# Patient Record
Sex: FEMALE | Race: WHITE | NOT HISPANIC OR LATINO | Employment: UNEMPLOYED | URBAN - NONMETROPOLITAN AREA
[De-identification: names, ages, dates, MRNs, and addresses within clinical notes are randomized per-mention and may not be internally consistent; named-entity substitution may affect disease eponyms.]

---

## 2017-01-28 PROCEDURE — 99284 EMERGENCY DEPT VISIT MOD MDM: CPT

## 2017-01-29 ENCOUNTER — APPOINTMENT (OUTPATIENT)
Dept: CT IMAGING | Facility: HOSPITAL | Age: 27
End: 2017-01-29

## 2017-01-29 ENCOUNTER — HOSPITAL ENCOUNTER (EMERGENCY)
Facility: HOSPITAL | Age: 27
Discharge: HOME | End: 2017-01-29
Attending: EMERGENCY MEDICINE | Admitting: EMERGENCY MEDICINE

## 2017-01-29 VITALS
TEMPERATURE: 97.9 F | WEIGHT: 155 LBS | DIASTOLIC BLOOD PRESSURE: 87 MMHG | SYSTOLIC BLOOD PRESSURE: 117 MMHG | HEART RATE: 79 BPM | HEIGHT: 60 IN | RESPIRATION RATE: 16 BRPM | OXYGEN SATURATION: 96 % | BODY MASS INDEX: 30.43 KG/M2

## 2017-01-29 DIAGNOSIS — K80.50 BILIARY COLIC: ICD-10-CM

## 2017-01-29 DIAGNOSIS — K80.20 CALCULUS OF GALLBLADDER WITHOUT CHOLECYSTITIS WITHOUT OBSTRUCTION: Primary | ICD-10-CM

## 2017-01-29 LAB
ALBUMIN SERPL-MCNC: 4 G/DL (ref 3.5–5)
ALBUMIN SERPL-MCNC: 4.2 G/DL (ref 3.5–5)
ALBUMIN/GLOB SERPL: 1.3 G/DL (ref 1–2)
ALBUMIN/GLOB SERPL: 1.4 G/DL (ref 1–2)
ALP SERPL-CCNC: 66 U/L (ref 38–126)
ALP SERPL-CCNC: 68 U/L (ref 38–126)
ALT SERPL W P-5'-P-CCNC: 93 U/L (ref 13–69)
ALT SERPL W P-5'-P-CCNC: 96 U/L (ref 13–69)
AMYLASE SERPL-CCNC: 48 U/L (ref 30–110)
ANION GAP SERPL CALCULATED.3IONS-SCNC: 12.2 MMOL/L
ANION GAP SERPL CALCULATED.3IONS-SCNC: 14.5 MMOL/L
AST SERPL-CCNC: 37 U/L (ref 15–46)
AST SERPL-CCNC: 37 U/L (ref 15–46)
B-HCG UR QL: NEGATIVE
BASOPHILS # BLD AUTO: 0.08 10*3/MM3 (ref 0–0.2)
BASOPHILS # BLD AUTO: 0.08 10*3/MM3 (ref 0–0.2)
BASOPHILS NFR BLD AUTO: 0.8 % (ref 0–2.5)
BASOPHILS NFR BLD AUTO: 0.8 % (ref 0–2.5)
BILIRUB SERPL-MCNC: 0.2 MG/DL (ref 0.2–1.3)
BILIRUB SERPL-MCNC: 0.3 MG/DL (ref 0.2–1.3)
BILIRUB UR QL STRIP: NEGATIVE
BUN BLD-MCNC: 12 MG/DL (ref 7–20)
BUN BLD-MCNC: 13 MG/DL (ref 7–20)
BUN/CREAT SERPL: 15 (ref 7.1–23.5)
BUN/CREAT SERPL: 16.3 (ref 7.1–23.5)
CALCIUM SPEC-SCNC: 9.3 MG/DL (ref 8.4–10.2)
CALCIUM SPEC-SCNC: 9.6 MG/DL (ref 8.4–10.2)
CHLORIDE SERPL-SCNC: 106 MMOL/L (ref 98–107)
CHLORIDE SERPL-SCNC: 106 MMOL/L (ref 98–107)
CLARITY UR: ABNORMAL
CO2 SERPL-SCNC: 26 MMOL/L (ref 26–30)
CO2 SERPL-SCNC: 28 MMOL/L (ref 26–30)
COLOR UR: ABNORMAL
CREAT BLD-MCNC: 0.8 MG/DL (ref 0.6–1.3)
CREAT BLD-MCNC: 0.8 MG/DL (ref 0.6–1.3)
DEPRECATED RDW RBC AUTO: 42.5 FL (ref 37–54)
DEPRECATED RDW RBC AUTO: 42.7 FL (ref 37–54)
EOSINOPHIL # BLD AUTO: 0.63 10*3/MM3 (ref 0–0.7)
EOSINOPHIL # BLD AUTO: 0.73 10*3/MM3 (ref 0–0.7)
EOSINOPHIL NFR BLD AUTO: 6.1 % (ref 0–7)
EOSINOPHIL NFR BLD AUTO: 7.1 % (ref 0–7)
ERYTHROCYTE [DISTWIDTH] IN BLOOD BY AUTOMATED COUNT: 12.4 % (ref 11.5–14.5)
ERYTHROCYTE [DISTWIDTH] IN BLOOD BY AUTOMATED COUNT: 12.5 % (ref 11.5–14.5)
GFR SERPL CREATININE-BSD FRML MDRD: 87 ML/MIN/1.73
GFR SERPL CREATININE-BSD FRML MDRD: 87 ML/MIN/1.73
GLOBULIN UR ELPH-MCNC: 3.1 GM/DL
GLOBULIN UR ELPH-MCNC: 3.1 GM/DL
GLUCOSE BLD-MCNC: 86 MG/DL (ref 74–98)
GLUCOSE BLD-MCNC: 87 MG/DL (ref 74–98)
GLUCOSE UR STRIP-MCNC: NEGATIVE MG/DL
HCG SERPL QL: NEGATIVE
HCT VFR BLD AUTO: 44.8 % (ref 37–47)
HCT VFR BLD AUTO: 45.6 % (ref 37–47)
HGB BLD-MCNC: 15.2 G/DL (ref 12–16)
HGB BLD-MCNC: 15.5 G/DL (ref 12–16)
HGB UR QL STRIP.AUTO: NEGATIVE
IMM GRANULOCYTES # BLD: 0.03 10*3/MM3 (ref 0–0.06)
IMM GRANULOCYTES # BLD: 0.04 10*3/MM3 (ref 0–0.06)
IMM GRANULOCYTES NFR BLD: 0.3 % (ref 0–0.6)
IMM GRANULOCYTES NFR BLD: 0.4 % (ref 0–0.6)
KETONES UR QL STRIP: NEGATIVE
LEUKOCYTE ESTERASE UR QL STRIP.AUTO: NEGATIVE
LIPASE SERPL-CCNC: 69 U/L (ref 23–300)
LIPASE SERPL-CCNC: 72 U/L (ref 23–300)
LYMPHOCYTES # BLD AUTO: 3.64 10*3/MM3 (ref 0.6–3.4)
LYMPHOCYTES # BLD AUTO: 3.64 10*3/MM3 (ref 0.6–3.4)
LYMPHOCYTES NFR BLD AUTO: 35 % (ref 10–50)
LYMPHOCYTES NFR BLD AUTO: 35.5 % (ref 10–50)
MCH RBC QN AUTO: 31.7 PG (ref 27–31)
MCH RBC QN AUTO: 31.8 PG (ref 27–31)
MCHC RBC AUTO-ENTMCNC: 33.9 G/DL (ref 30–37)
MCHC RBC AUTO-ENTMCNC: 34 G/DL (ref 30–37)
MCV RBC AUTO: 93.3 FL (ref 81–99)
MCV RBC AUTO: 93.6 FL (ref 81–99)
MONOCYTES # BLD AUTO: 0.63 10*3/MM3 (ref 0–0.9)
MONOCYTES # BLD AUTO: 0.7 10*3/MM3 (ref 0–0.9)
MONOCYTES NFR BLD AUTO: 6.1 % (ref 0–12)
MONOCYTES NFR BLD AUTO: 6.7 % (ref 0–12)
NEUTROPHILS # BLD AUTO: 5.13 10*3/MM3 (ref 2–6.9)
NEUTROPHILS # BLD AUTO: 5.33 10*3/MM3 (ref 2–6.9)
NEUTROPHILS NFR BLD AUTO: 50.1 % (ref 37–80)
NEUTROPHILS NFR BLD AUTO: 51.1 % (ref 37–80)
NITRITE UR QL STRIP: NEGATIVE
NRBC BLD MANUAL-RTO: 0 /100 WBC (ref 0–0)
NRBC BLD MANUAL-RTO: 0 /100 WBC (ref 0–0)
PH UR STRIP.AUTO: 5.5 [PH] (ref 5–8)
PLATELET # BLD AUTO: 267 10*3/MM3 (ref 130–400)
PLATELET # BLD AUTO: 273 10*3/MM3 (ref 130–400)
PMV BLD AUTO: 11.4 FL (ref 6–12)
PMV BLD AUTO: 12.1 FL (ref 6–12)
POTASSIUM BLD-SCNC: 4.2 MMOL/L (ref 3.5–5.1)
POTASSIUM BLD-SCNC: 4.5 MMOL/L (ref 3.5–5.1)
PROT SERPL-MCNC: 7.1 G/DL (ref 6.3–8.2)
PROT SERPL-MCNC: 7.3 G/DL (ref 6.3–8.2)
PROT UR QL STRIP: NEGATIVE
RBC # BLD AUTO: 4.8 10*6/MM3 (ref 4.2–5.4)
RBC # BLD AUTO: 4.87 10*6/MM3 (ref 4.2–5.4)
SODIUM BLD-SCNC: 142 MMOL/L (ref 137–145)
SODIUM BLD-SCNC: 142 MMOL/L (ref 137–145)
SP GR UR STRIP: 1.01 (ref 1–1.03)
UROBILINOGEN UR QL STRIP: ABNORMAL
WBC NRBC COR # BLD: 10.25 10*3/MM3 (ref 4.8–10.8)
WBC NRBC COR # BLD: 10.41 10*3/MM3 (ref 4.8–10.8)
WHOLE BLOOD HOLD SPECIMEN: NORMAL
WHOLE BLOOD HOLD SPECIMEN: NORMAL

## 2017-01-29 PROCEDURE — 74177 CT ABD & PELVIS W/CONTRAST: CPT

## 2017-01-29 PROCEDURE — 80053 COMPREHEN METABOLIC PANEL: CPT | Performed by: EMERGENCY MEDICINE

## 2017-01-29 PROCEDURE — 25010000002 ONDANSETRON PER 1 MG: Performed by: EMERGENCY MEDICINE

## 2017-01-29 PROCEDURE — 85025 COMPLETE CBC W/AUTO DIFF WBC: CPT | Performed by: EMERGENCY MEDICINE

## 2017-01-29 PROCEDURE — 84703 CHORIONIC GONADOTROPIN ASSAY: CPT | Performed by: EMERGENCY MEDICINE

## 2017-01-29 PROCEDURE — 96374 THER/PROPH/DIAG INJ IV PUSH: CPT

## 2017-01-29 PROCEDURE — 96361 HYDRATE IV INFUSION ADD-ON: CPT

## 2017-01-29 PROCEDURE — 82150 ASSAY OF AMYLASE: CPT | Performed by: EMERGENCY MEDICINE

## 2017-01-29 PROCEDURE — 0 IOPAMIDOL 61 % SOLUTION: Performed by: EMERGENCY MEDICINE

## 2017-01-29 PROCEDURE — 96376 TX/PRO/DX INJ SAME DRUG ADON: CPT

## 2017-01-29 PROCEDURE — 25010000002 HYDROMORPHONE PER 4 MG: Performed by: EMERGENCY MEDICINE

## 2017-01-29 PROCEDURE — 96375 TX/PRO/DX INJ NEW DRUG ADDON: CPT

## 2017-01-29 PROCEDURE — 83690 ASSAY OF LIPASE: CPT | Performed by: EMERGENCY MEDICINE

## 2017-01-29 PROCEDURE — 81003 URINALYSIS AUTO W/O SCOPE: CPT | Performed by: EMERGENCY MEDICINE

## 2017-01-29 PROCEDURE — 81025 URINE PREGNANCY TEST: CPT | Performed by: EMERGENCY MEDICINE

## 2017-01-29 RX ORDER — ONDANSETRON 2 MG/ML
4 INJECTION INTRAMUSCULAR; INTRAVENOUS ONCE
Status: COMPLETED | OUTPATIENT
Start: 2017-01-29 | End: 2017-01-29

## 2017-01-29 RX ORDER — FAMOTIDINE 10 MG/ML
20 INJECTION, SOLUTION INTRAVENOUS ONCE
Status: COMPLETED | OUTPATIENT
Start: 2017-01-29 | End: 2017-01-29

## 2017-01-29 RX ORDER — ONDANSETRON 4 MG/1
4 TABLET, ORALLY DISINTEGRATING ORAL EVERY 6 HOURS PRN
Qty: 10 TABLET | Refills: 0 | Status: ON HOLD | OUTPATIENT
Start: 2017-01-29 | End: 2017-06-05

## 2017-01-29 RX ORDER — HYDROCODONE BITARTRATE AND ACETAMINOPHEN 5; 325 MG/1; MG/1
1 TABLET ORAL EVERY 4 HOURS PRN
Qty: 14 TABLET | Refills: 0 | Status: SHIPPED | OUTPATIENT
Start: 2017-01-29 | End: 2017-02-08 | Stop reason: SDUPTHER

## 2017-01-29 RX ORDER — SODIUM CHLORIDE 0.9 % (FLUSH) 0.9 %
10 SYRINGE (ML) INJECTION AS NEEDED
Status: DISCONTINUED | OUTPATIENT
Start: 2017-01-29 | End: 2017-01-29 | Stop reason: HOSPADM

## 2017-01-29 RX ADMIN — HYDROMORPHONE HYDROCHLORIDE 1 MG: 1 INJECTION, SOLUTION INTRAMUSCULAR; INTRAVENOUS; SUBCUTANEOUS at 01:34

## 2017-01-29 RX ADMIN — FAMOTIDINE 20 MG: 10 INJECTION, SOLUTION INTRAVENOUS at 01:34

## 2017-01-29 RX ADMIN — IOPAMIDOL 100 ML: 612 INJECTION, SOLUTION INTRAVENOUS at 02:29

## 2017-01-29 RX ADMIN — SODIUM CHLORIDE 1000 ML: 9 INJECTION, SOLUTION INTRAVENOUS at 01:34

## 2017-01-29 RX ADMIN — HYDROMORPHONE HYDROCHLORIDE 1 MG: 1 INJECTION, SOLUTION INTRAMUSCULAR; INTRAVENOUS; SUBCUTANEOUS at 04:05

## 2017-01-29 RX ADMIN — ONDANSETRON 4 MG: 2 INJECTION INTRAMUSCULAR; INTRAVENOUS at 01:34

## 2017-02-08 ENCOUNTER — OFFICE VISIT (OUTPATIENT)
Dept: SURGERY | Facility: CLINIC | Age: 27
End: 2017-02-08

## 2017-02-08 VITALS
HEIGHT: 65 IN | SYSTOLIC BLOOD PRESSURE: 124 MMHG | DIASTOLIC BLOOD PRESSURE: 78 MMHG | BODY MASS INDEX: 25.99 KG/M2 | WEIGHT: 156 LBS | TEMPERATURE: 97.8 F | RESPIRATION RATE: 16 BRPM

## 2017-02-08 DIAGNOSIS — K80.12 CALCULUS OF GALLBLADDER WITH ACUTE ON CHRONIC CHOLECYSTITIS WITHOUT OBSTRUCTION: ICD-10-CM

## 2017-02-08 DIAGNOSIS — K81.9 CHOLECYSTITIS: Primary | ICD-10-CM

## 2017-02-08 PROCEDURE — 99243 OFF/OP CNSLTJ NEW/EST LOW 30: CPT | Performed by: SURGERY

## 2017-02-08 RX ORDER — CIPROFLOXACIN 750 MG/1
750 TABLET, FILM COATED ORAL 2 TIMES DAILY
Qty: 10 TABLET | Refills: 0
Start: 2017-02-08 | End: 2017-02-13

## 2017-02-08 RX ORDER — HYDROCODONE BITARTRATE AND ACETAMINOPHEN 5; 325 MG/1; MG/1
1 TABLET ORAL EVERY 4 HOURS PRN
Qty: 14 TABLET | Refills: 0 | Status: ON HOLD | OUTPATIENT
Start: 2017-02-08 | End: 2017-06-05

## 2017-02-08 RX ORDER — IBUPROFEN 600 MG/1
TABLET ORAL EVERY 6 HOURS
Status: ON HOLD | COMMUNITY
Start: 2015-12-17 | End: 2017-06-05

## 2017-02-08 NOTE — PROGRESS NOTES
Reason for Consultation: Cholecystitis      Chief complaint   Chief Complaint   Patient presents with   • Cholelithiasis     abn ct cholelithiasis/nausea,ruq pain,diarrhea worse with fatty foods       Subjective .     History of present illness:  I saw the patient in the office today as a consultation for evaluation and treatment of ***    Review of Systems   Gastrointestinal: Positive for abdominal pain (ruq pain), diarrhea and nausea.       History  Past Medical History   Diagnosis Date   • Asthma    • Cholelithiasis        Past Surgical History   Procedure Laterality Date   • Ear tubes     • Tonsillectomy         History reviewed. No pertinent family history.    Social History   Substance Use Topics   • Smoking status: Current Every Day Smoker     Packs/day: 1.00   • Smokeless tobacco: None   • Alcohol use No       Codeine    Objective     Vital Signs   Temp:  [97.8 °F (36.6 °C)] 97.8 °F (36.6 °C)  Resp:  [16] 16  BP: (124)/(78) 124/78    Physical Exam:     General Appearance:    Alert, cooperative, in no acute distress   Head:    Normocephalic, without obvious abnormality, atraumatic   Eyes:            Lids and lashes normal, conjunctivae and sclerae normal, no   icterus, no pallor, corneas clear, PERRLA   Ears:    Ears appear intact with no abnormalities noted   Throat:   No oral lesions, no thrush, oral mucosa moist   Neck:   No adenopathy, supple, trachea midline, no thyromegaly, no   carotid bruit, no JVD   Back:     No kyphosis present, no scoliosis present, no skin lesions,      erythema or scars, no tenderness to percussion or                   palpation,   range of motion normal   Lungs:     Clear to auscultation,respirations regular, even and                  unlabored    Heart:    Regular rhythm and normal rate, normal S1 and S2, no            murmur   Abdomen:     no masses, no organomegaly, soft non-tender, non-distended, no guarding, there is evidence of right upper quadrant tenderness  "  Extremities:   Moves all extremities well, no edema, no cyanosis, no             redness   Pulses:   Pulses palpable and equal bilaterally   Skin:   No bleeding, bruising or rash   Lymph nodes:   No palpable adenopathy   Neurologic:   Cranial nerves 2 - 12 grossly intact, sensation intact        Results Review:   {Results Review:76142::\"I reviewed the patient's new clinical results.\"}      Assessment/Plan :      I had a detailed and extensive discussion with the patient in the office and they understand that they need to undergo laparoscopic cholecystectomy with intraoperative cholangiography or possible open cholecystectomy. Full risks and benefits of operative versus nonoperative intervention were discussed with the patient and these included things such as nonresolution of symptoms and possible worsening of symptoms without surgical intervention versus infection, bleeding, open cholecystectomy, common bile duct injury, postoperative biliary leakage, need for drain placement, possible inability to perform cholangiography due to inflammation, postoperative abscess, etc with surgical intervention. The patient understands, agrees, and wishes to proceed with the surgical treatment plan as mentioned above. The patient had no questions for me at the end of the discussion.  I did draw a picture of the anatomy for the patient and used this in my informed consent.     I discussed the patients findings and my recommendations with patient and consulting provider.        Cee Quigley MA  02/08/17  2:55 PM          "

## 2017-02-08 NOTE — PROGRESS NOTES
Reason for Consultation: Cholecystitis      Chief complaint   Chief Complaint   Patient presents with   • Cholelithiasis     abn ct cholelithiasis/nausea,ruq pain,diarrhea worse with fatty foods       Subjective .     History of present illness:  I saw the patient in the office today as a consultation for evaluation and treatment of right upper quadrant pain present for the past 2 weeks, this is constant, sharp, in the right upper quadrant, radiates to the back, made worse with fatty meals, made better with not eating.  No dark colored urine or light colored stool.    Review of Systems   Constitutional: Positive for appetite change. Negative for fatigue and fever.   HENT: Negative for congestion, nosebleeds and postnasal drip.    Eyes: Negative for photophobia.   Respiratory: Negative for cough, choking, chest tightness and shortness of breath.    Cardiovascular: Negative for chest pain, palpitations and leg swelling.   Gastrointestinal: Positive for abdominal pain (right upper quadrant / epigastric pain) and nausea.   Endocrine: Negative for cold intolerance and heat intolerance.   Genitourinary: Negative for flank pain and frequency.   Musculoskeletal: Negative for arthralgias.   Neurological: Negative for seizures, light-headedness, numbness and headaches.   Psychiatric/Behavioral: Negative for agitation, behavioral problems, confusion and hallucinations.       History  Past Medical History   Diagnosis Date   • Asthma    • Cholelithiasis        Past Surgical History   Procedure Laterality Date   • Ear tubes     • Tonsillectomy         History reviewed. No pertinent family history.    Social History   Substance Use Topics   • Smoking status: Current Every Day Smoker     Packs/day: 1.00   • Smokeless tobacco: None   • Alcohol use No       Codeine    Objective     Vital Signs   Temp:  [97.8 °F (36.6 °C)] 97.8 °F (36.6 °C)  Resp:  [16] 16  BP: (124)/(78) 124/78    Physical Exam:     General Appearance:    Alert,  cooperative, in no acute distress   Head:    Normocephalic, without obvious abnormality, atraumatic   Eyes:            Lids and lashes normal, conjunctivae and sclerae normal, no   icterus, no pallor, corneas clear, PERRLA   Ears:    Ears appear intact with no abnormalities noted   Throat:   No oral lesions, no thrush, oral mucosa moist   Neck:   No adenopathy, supple, trachea midline, no thyromegaly, no   carotid bruit, no JVD   Back:     No kyphosis present, no scoliosis present, no skin lesions,      erythema or scars, no tenderness to percussion or                   palpation,   range of motion normal   Lungs:     Clear to auscultation,respirations regular, even and                  unlabored    Heart:    Regular rhythm and normal rate, normal S1 and S2, no            murmur   Abdomen:     no masses, no organomegaly, soft non-tender, non-distended, no guarding, there is evidence of right upper quadrant tenderness   Extremities:   Moves all extremities well, no edema, no cyanosis, no             redness   Pulses:   Pulses palpable and equal bilaterally   Skin:   No bleeding, bruising or rash   Lymph nodes:   No palpable adenopathy   Neurologic:   Cranial nerves 2 - 12 grossly intact, sensation intact        Results Review:   I reviewed the patient's new clinical results.  I reviewed the patient's new imaging results and agree with the interpretation.  I reviewed the patient's other test results and agree with the interpretation      Assessment/Plan :      I had a detailed and extensive discussion with the patient in the office and they understand that they need to undergo laparoscopic cholecystectomy with intraoperative cholangiography or possible open cholecystectomy sometime in the future.  First I want the patient to have a gallbladder ultrasound, I will start her on pain meds and antibiotics, and then see her again tomorrow.  They understand, agree, and had no questions for me at the end of the  discussion    Norris Almazan MD  02/08/17  3:40 PM

## 2017-02-09 ENCOUNTER — HOSPITAL ENCOUNTER (OUTPATIENT)
Dept: ULTRASOUND IMAGING | Facility: HOSPITAL | Age: 27
Discharge: HOME | End: 2017-02-09
Attending: SURGERY | Admitting: SURGERY

## 2017-02-09 ENCOUNTER — OFFICE VISIT (OUTPATIENT)
Dept: SURGERY | Facility: CLINIC | Age: 27
End: 2017-02-09

## 2017-02-09 VITALS
SYSTOLIC BLOOD PRESSURE: 128 MMHG | HEIGHT: 65 IN | WEIGHT: 156 LBS | BODY MASS INDEX: 25.99 KG/M2 | DIASTOLIC BLOOD PRESSURE: 80 MMHG | TEMPERATURE: 98.2 F | RESPIRATION RATE: 16 BRPM

## 2017-02-09 DIAGNOSIS — K81.9 CHOLECYSTITIS: ICD-10-CM

## 2017-02-09 DIAGNOSIS — K80.01 CALCULUS OF GALLBLADDER WITH ACUTE CHOLECYSTITIS AND OBSTRUCTION: ICD-10-CM

## 2017-02-09 DIAGNOSIS — K80.12 CALCULUS OF GALLBLADDER WITH ACUTE ON CHRONIC CHOLECYSTITIS WITHOUT OBSTRUCTION: ICD-10-CM

## 2017-02-09 DIAGNOSIS — K81.9 CHOLECYSTITIS: Primary | ICD-10-CM

## 2017-02-09 PROCEDURE — 76705 ECHO EXAM OF ABDOMEN: CPT

## 2017-02-09 PROCEDURE — 99213 OFFICE O/P EST LOW 20 MIN: CPT | Performed by: SURGERY

## 2017-02-09 NOTE — PROGRESS NOTES
Reason for Consultation: Cholecystitis      Chief complaint   Chief Complaint   Patient presents with   • Follow-up     f/u gb us, c/o nausea, ruq pain       Subjective .     History of present illness:  I saw the patient in the office today as a consultation for evaluation and treatment of right upper quadrant pain.  She did have a gallbladder ultrasound performed today, she states that she still has right upper quadrant pain with nausea.  She did have gallstones on the ultrasound, she stated that she was tender when the test was performed.    Review of Systems   Constitutional: Positive for appetite change. Negative for fatigue and fever.   HENT: Negative for congestion, nosebleeds and postnasal drip.    Eyes: Negative for photophobia.   Respiratory: Negative for cough, choking, chest tightness and shortness of breath.    Cardiovascular: Negative for chest pain, palpitations and leg swelling.   Gastrointestinal: Positive for abdominal pain (right upper quadrant / epigastric pain) and nausea.   Endocrine: Negative for cold intolerance and heat intolerance.   Genitourinary: Negative for flank pain and frequency.   Musculoskeletal: Negative for arthralgias.   Neurological: Negative for seizures, light-headedness, numbness and headaches.   Psychiatric/Behavioral: Negative for agitation, behavioral problems, confusion and hallucinations.       History  Past Medical History   Diagnosis Date   • Asthma    • Cholelithiasis        Past Surgical History   Procedure Laterality Date   • Ear tubes     • Tonsillectomy         Family History   Problem Relation Age of Onset   • No Known Problems Mother    • No Known Problems Father    • No Known Problems Sister    • No Known Problems Brother        Social History   Substance Use Topics   • Smoking status: Current Every Day Smoker     Packs/day: 1.00   • Smokeless tobacco: Never Used   • Alcohol use No       Codeine    Objective     Vital Signs   Temp:  [97.8 °F (36.6 °C)-98.2  °F (36.8 °C)] 98.2 °F (36.8 °C)  Resp:  [16] 16  BP: (124-128)/(78-80) 128/80    Physical Exam:     General Appearance:    Alert, cooperative, in no acute distress   Head:    Normocephalic, without obvious abnormality, atraumatic   Eyes:            Lids and lashes normal, conjunctivae and sclerae normal, no   icterus, no pallor, corneas clear, PERRLA   Ears:    Ears appear intact with no abnormalities noted   Throat:   No oral lesions, no thrush, oral mucosa moist   Neck:   No adenopathy, supple, trachea midline, no thyromegaly, no   carotid bruit, no JVD   Back:     No kyphosis present, no scoliosis present, no skin lesions,      erythema or scars, no tenderness to percussion or                   palpation,   range of motion normal   Lungs:     Clear to auscultation,respirations regular, even and                  unlabored    Heart:    Regular rhythm and normal rate, normal S1 and S2, no            murmur   Abdomen:     no masses, no organomegaly, soft non-tender, non-distended, no guarding, there is evidence of right upper quadrant tenderness   Extremities:   Moves all extremities well, no edema, no cyanosis, no             redness   Pulses:   Pulses palpable and equal bilaterally   Skin:   No bleeding, bruising or rash   Lymph nodes:   No palpable adenopathy   Neurologic:   Cranial nerves 2 - 12 grossly intact, sensation intact        Results Review:   I reviewed the patient's new clinical results.  I reviewed the patient's new imaging results and agree with the interpretation.  I reviewed the patient's other test results and agree with the interpretation      Assessment/Plan :      I had a detailed and extensive discussion with the patient in the office and they understand that they need to undergo laparoscopic cholecystectomy with intraoperative cholangiography or possible open cholecystectomy. Full risks and benefits of operative versus nonoperative intervention were discussed with the patient and these  included things such as nonresolution of symptoms and possible worsening of symptoms without surgical intervention versus infection, bleeding, open cholecystectomy, common bile duct injury, postoperative biliary leakage, need for drain placement, possible inability to perform cholangiography due to inflammation, postoperative abscess, etc with surgical intervention. The patient understands, agrees, and wishes to proceed with the surgical treatment plan as mentioned above. The patient had no questions for me at the end of the discussion.  I did draw a picture of the anatomy for the patient and used this in my informed consent.     I discussed the patients findings and my recommendations with patient and consulting provider.        Norris Almazan MD  02/09/17  9:48 AM

## 2017-02-09 NOTE — PROGRESS NOTES
"    Patient Care Team:  JOY Vasquez as PCP - General (Family Medicine)        Chief Complaint   Patient presents with   • Follow-up     f/u gb us, c/o nausea, ruq pain       Interval History:     History : ***    Review of Systems   Gastrointestinal: Positive for abdominal pain, diarrhea, nausea and vomiting.       Vital Signs  Visit Vitals   • /80 (BP Location: Left arm, Patient Position: Sitting)   • Temp 98.2 °F (36.8 °C) (Oral)   • Resp 16   • Ht 65\" (165.1 cm)   • Wt 156 lb (70.8 kg)   • BMI 25.96 kg/m2       Physical Exam:     General Appearance:    Alert, cooperative, in no acute distress   Head:    Normocephalic, without obvious abnormality, atraumatic   Lungs:     Clear to auscultation,respirations regular, even and                  unlabored    Heart:    Regular rhythm and normal rate, normal S1 and S2, no            murmur, no gallop, no rub, no click   Abdomen:     Normal bowel sounds, no masses, no organomegaly, soft        non-tender, non-distended, no guarding, no rebound                tenderness   Extremities:   Moves all extremities well, no edema, no cyanosis, no             redness   Pulses:   Pulses palpable and equal bilaterally   Skin:   No bleeding, bruising or rash        Assessment/Plan      There are no diagnoses linked to this encounter.              Norris Almazan MD  02/09/17  9:48 AM      "

## 2017-02-10 ENCOUNTER — OUTSIDE FACILITY SERVICE (OUTPATIENT)
Dept: SURGERY | Facility: CLINIC | Age: 27
End: 2017-02-10

## 2017-02-10 PROCEDURE — 47563 LAPARO CHOLECYSTECTOMY/GRAPH: CPT | Performed by: SURGERY

## 2017-02-13 ENCOUNTER — RESULTS ENCOUNTER (OUTPATIENT)
Dept: SURGERY | Facility: CLINIC | Age: 27
End: 2017-02-13

## 2017-02-13 DIAGNOSIS — K80.12 CALCULUS OF GALLBLADDER WITH ACUTE ON CHRONIC CHOLECYSTITIS WITHOUT OBSTRUCTION: ICD-10-CM

## 2017-02-13 DIAGNOSIS — K81.9 CHOLECYSTITIS: ICD-10-CM

## 2017-05-11 ENCOUNTER — HOSPITAL ENCOUNTER (EMERGENCY)
Facility: HOSPITAL | Age: 27
Discharge: HOME | End: 2017-05-11
Attending: EMERGENCY MEDICINE | Admitting: EMERGENCY MEDICINE

## 2017-05-11 ENCOUNTER — APPOINTMENT (OUTPATIENT)
Dept: GENERAL RADIOLOGY | Facility: HOSPITAL | Age: 27
End: 2017-05-11

## 2017-05-11 VITALS
DIASTOLIC BLOOD PRESSURE: 99 MMHG | TEMPERATURE: 98.7 F | OXYGEN SATURATION: 97 % | BODY MASS INDEX: 26.43 KG/M2 | HEART RATE: 117 BPM | SYSTOLIC BLOOD PRESSURE: 113 MMHG | WEIGHT: 140 LBS | RESPIRATION RATE: 16 BRPM | HEIGHT: 61 IN

## 2017-05-11 DIAGNOSIS — J06.9 URI WITH COUGH AND CONGESTION: Primary | ICD-10-CM

## 2017-05-11 PROCEDURE — 71020 HC CHEST PA AND LATERAL: CPT

## 2017-05-11 PROCEDURE — 96372 THER/PROPH/DIAG INJ SC/IM: CPT

## 2017-05-11 PROCEDURE — 99283 EMERGENCY DEPT VISIT LOW MDM: CPT

## 2017-05-11 PROCEDURE — 25010000002 KETOROLAC TROMETHAMINE PER 15 MG: Performed by: NURSE PRACTITIONER

## 2017-05-11 RX ORDER — ALBUTEROL SULFATE 90 UG/1
2 AEROSOL, METERED RESPIRATORY (INHALATION) EVERY 4 HOURS PRN
Qty: 18 G | Refills: 0 | Status: SHIPPED | OUTPATIENT
Start: 2017-05-11 | End: 2017-09-11 | Stop reason: SDUPTHER

## 2017-05-11 RX ORDER — INDOMETHACIN 50 MG/1
50 CAPSULE ORAL
Qty: 12 CAPSULE | Refills: 0 | Status: ON HOLD | OUTPATIENT
Start: 2017-05-11 | End: 2017-06-05

## 2017-05-11 RX ORDER — AMOXICILLIN AND CLAVULANATE POTASSIUM 875; 125 MG/1; MG/1
1 TABLET, FILM COATED ORAL 2 TIMES DAILY
Qty: 20 TABLET | Refills: 0 | Status: ON HOLD | OUTPATIENT
Start: 2017-05-11 | End: 2017-06-05

## 2017-05-11 RX ORDER — KETOROLAC TROMETHAMINE 30 MG/ML
60 INJECTION, SOLUTION INTRAMUSCULAR; INTRAVENOUS ONCE
Status: COMPLETED | OUTPATIENT
Start: 2017-05-11 | End: 2017-05-11

## 2017-05-11 RX ORDER — FLUTICASONE PROPIONATE 110 UG/1
2 AEROSOL, METERED RESPIRATORY (INHALATION)
Qty: 12 G | Refills: 0 | Status: ON HOLD | OUTPATIENT
Start: 2017-05-11 | End: 2017-06-05

## 2017-05-11 RX ORDER — ALBUTEROL SULFATE 90 UG/1
2 AEROSOL, METERED RESPIRATORY (INHALATION) EVERY 4 HOURS PRN
Status: ON HOLD | COMMUNITY
End: 2017-06-05

## 2017-05-11 RX ADMIN — KETOROLAC TROMETHAMINE 60 MG: 60 INJECTION, SOLUTION INTRAMUSCULAR at 18:07

## 2017-05-15 ENCOUNTER — HOSPITAL ENCOUNTER (EMERGENCY)
Facility: HOSPITAL | Age: 27
Discharge: HOME | End: 2017-05-15
Attending: EMERGENCY MEDICINE | Admitting: EMERGENCY MEDICINE

## 2017-05-15 ENCOUNTER — APPOINTMENT (OUTPATIENT)
Dept: GENERAL RADIOLOGY | Facility: HOSPITAL | Age: 27
End: 2017-05-15

## 2017-05-15 VITALS
OXYGEN SATURATION: 90 % | BODY MASS INDEX: 26.43 KG/M2 | RESPIRATION RATE: 20 BRPM | WEIGHT: 140 LBS | DIASTOLIC BLOOD PRESSURE: 76 MMHG | SYSTOLIC BLOOD PRESSURE: 118 MMHG | HEART RATE: 106 BPM | HEIGHT: 61 IN | TEMPERATURE: 99 F

## 2017-05-15 DIAGNOSIS — J18.9 PNEUMONIA OF LEFT LOWER LOBE DUE TO INFECTIOUS ORGANISM: Primary | ICD-10-CM

## 2017-05-15 PROCEDURE — 96372 THER/PROPH/DIAG INJ SC/IM: CPT

## 2017-05-15 PROCEDURE — 25010000002 KETOROLAC TROMETHAMINE PER 15 MG: Performed by: PHYSICIAN ASSISTANT

## 2017-05-15 PROCEDURE — 94640 AIRWAY INHALATION TREATMENT: CPT

## 2017-05-15 PROCEDURE — 99283 EMERGENCY DEPT VISIT LOW MDM: CPT

## 2017-05-15 PROCEDURE — 71101 X-RAY EXAM UNILAT RIBS/CHEST: CPT

## 2017-05-15 RX ORDER — DOXYCYCLINE 100 MG/1
100 CAPSULE ORAL EVERY 12 HOURS SCHEDULED
Status: DISCONTINUED | OUTPATIENT
Start: 2017-05-16 | End: 2017-05-15

## 2017-05-15 RX ORDER — BENZONATATE 100 MG/1
100 CAPSULE ORAL 3 TIMES DAILY PRN
Qty: 6 CAPSULE | Refills: 0 | Status: ON HOLD | OUTPATIENT
Start: 2017-05-15 | End: 2017-06-05

## 2017-05-15 RX ORDER — KETOROLAC TROMETHAMINE 30 MG/ML
60 INJECTION, SOLUTION INTRAMUSCULAR; INTRAVENOUS ONCE
Status: COMPLETED | OUTPATIENT
Start: 2017-05-15 | End: 2017-05-15

## 2017-05-15 RX ORDER — DOXYCYCLINE 100 MG/1
100 CAPSULE ORAL ONCE
Status: COMPLETED | OUTPATIENT
Start: 2017-05-15 | End: 2017-05-15

## 2017-05-15 RX ORDER — DOXYCYCLINE HYCLATE 50 MG/1
100 CAPSULE ORAL ONCE
Status: DISCONTINUED | OUTPATIENT
Start: 2017-05-15 | End: 2017-05-15

## 2017-05-15 RX ORDER — DOXYCYCLINE 100 MG/1
100 CAPSULE ORAL 2 TIMES DAILY
Qty: 14 CAPSULE | Refills: 0 | Status: SHIPPED | OUTPATIENT
Start: 2017-05-15 | End: 2017-05-22

## 2017-05-15 RX ORDER — IPRATROPIUM BROMIDE AND ALBUTEROL SULFATE 2.5; .5 MG/3ML; MG/3ML
3 SOLUTION RESPIRATORY (INHALATION) ONCE
Status: COMPLETED | OUTPATIENT
Start: 2017-05-15 | End: 2017-05-15

## 2017-05-15 RX ADMIN — DOXYCYCLINE 100 MG: 100 CAPSULE ORAL at 01:37

## 2017-05-15 RX ADMIN — KETOROLAC TROMETHAMINE 60 MG: 30 INJECTION, SOLUTION INTRAMUSCULAR at 01:17

## 2017-05-15 RX ADMIN — IPRATROPIUM BROMIDE AND ALBUTEROL SULFATE 3 ML: .5; 3 SOLUTION RESPIRATORY (INHALATION) at 01:08

## 2017-06-04 ENCOUNTER — HOSPITAL ENCOUNTER (EMERGENCY)
Facility: HOSPITAL | Age: 27
Discharge: OTHER INSTITUTION | End: 2017-06-05
Attending: EMERGENCY MEDICINE | Admitting: EMERGENCY MEDICINE

## 2017-06-04 ENCOUNTER — APPOINTMENT (OUTPATIENT)
Dept: CT IMAGING | Facility: HOSPITAL | Age: 27
End: 2017-06-04

## 2017-06-04 ENCOUNTER — APPOINTMENT (OUTPATIENT)
Dept: GENERAL RADIOLOGY | Facility: HOSPITAL | Age: 27
End: 2017-06-04

## 2017-06-04 DIAGNOSIS — J18.9 PNEUMONIA OF BOTH UPPER LOBES DUE TO INFECTIOUS ORGANISM: Primary | ICD-10-CM

## 2017-06-04 PROBLEM — J45.909 ASTHMA: Status: ACTIVE | Noted: 2017-06-04

## 2017-06-04 LAB
ALBUMIN SERPL-MCNC: 4.3 G/DL (ref 3.5–5)
ALBUMIN/GLOB SERPL: 1.3 G/DL (ref 1–2)
ALP SERPL-CCNC: 85 U/L (ref 38–126)
ALT SERPL W P-5'-P-CCNC: 88 U/L (ref 13–69)
ANION GAP SERPL CALCULATED.3IONS-SCNC: 15.5 MMOL/L
AST SERPL-CCNC: 50 U/L (ref 15–46)
BASOPHILS # BLD AUTO: 0.07 10*3/MM3 (ref 0–0.2)
BASOPHILS NFR BLD AUTO: 0.5 % (ref 0–2.5)
BILIRUB SERPL-MCNC: 1.1 MG/DL (ref 0.2–1.3)
BILIRUB UR QL STRIP: NEGATIVE
BUN BLD-MCNC: 9 MG/DL (ref 7–20)
BUN/CREAT SERPL: 12.9 (ref 7.1–23.5)
CALCIUM SPEC-SCNC: 9 MG/DL (ref 8.4–10.2)
CHLORIDE SERPL-SCNC: 99 MMOL/L (ref 98–107)
CLARITY UR: CLEAR
CO2 SERPL-SCNC: 24 MMOL/L (ref 26–30)
COLOR UR: YELLOW
CREAT BLD-MCNC: 0.7 MG/DL (ref 0.6–1.3)
D-LACTATE SERPL-SCNC: 0.9 MMOL/L (ref 0.5–2)
DEPRECATED RDW RBC AUTO: 41 FL (ref 37–54)
EOSINOPHIL # BLD AUTO: 0.73 10*3/MM3 (ref 0–0.7)
EOSINOPHIL NFR BLD AUTO: 4.7 % (ref 0–7)
ERYTHROCYTE [DISTWIDTH] IN BLOOD BY AUTOMATED COUNT: 12.1 % (ref 11.5–14.5)
GFR SERPL CREATININE-BSD FRML MDRD: 101 ML/MIN/1.73
GLOBULIN UR ELPH-MCNC: 3.2 GM/DL
GLUCOSE BLD-MCNC: 107 MG/DL (ref 74–98)
GLUCOSE UR STRIP-MCNC: NEGATIVE MG/DL
HCT VFR BLD AUTO: 44.9 % (ref 37–47)
HGB BLD-MCNC: 15.3 G/DL (ref 12–16)
HGB UR QL STRIP.AUTO: NEGATIVE
HOLD SPECIMEN: NORMAL
HOLD SPECIMEN: NORMAL
IMM GRANULOCYTES # BLD: 0.05 10*3/MM3 (ref 0–0.06)
IMM GRANULOCYTES NFR BLD: 0.3 % (ref 0–0.6)
KETONES UR QL STRIP: NEGATIVE
LEUKOCYTE ESTERASE UR QL STRIP.AUTO: NEGATIVE
LYMPHOCYTES # BLD AUTO: 2.43 10*3/MM3 (ref 0.6–3.4)
LYMPHOCYTES NFR BLD AUTO: 15.6 % (ref 10–50)
MCH RBC QN AUTO: 31.4 PG (ref 27–31)
MCHC RBC AUTO-ENTMCNC: 34.1 G/DL (ref 30–37)
MCV RBC AUTO: 92.2 FL (ref 81–99)
MONOCYTES # BLD AUTO: 1.22 10*3/MM3 (ref 0–0.9)
MONOCYTES NFR BLD AUTO: 7.8 % (ref 0–12)
NEUTROPHILS # BLD AUTO: 11.05 10*3/MM3 (ref 2–6.9)
NEUTROPHILS NFR BLD AUTO: 71.1 % (ref 37–80)
NITRITE UR QL STRIP: NEGATIVE
NRBC BLD MANUAL-RTO: 0 /100 WBC (ref 0–0)
PH UR STRIP.AUTO: <=5 [PH] (ref 5–8)
PLATELET # BLD AUTO: 225 10*3/MM3 (ref 130–400)
PMV BLD AUTO: 11.7 FL (ref 6–12)
POTASSIUM BLD-SCNC: 4.5 MMOL/L (ref 3.5–5.1)
PROT SERPL-MCNC: 7.5 G/DL (ref 6.3–8.2)
PROT UR QL STRIP: NEGATIVE
RBC # BLD AUTO: 4.87 10*6/MM3 (ref 4.2–5.4)
SODIUM BLD-SCNC: 134 MMOL/L (ref 137–145)
SP GR UR STRIP: <=1.005 (ref 1–1.03)
UROBILINOGEN UR QL STRIP: NORMAL
WBC NRBC COR # BLD: 15.55 10*3/MM3 (ref 4.8–10.8)
WHOLE BLOOD HOLD SPECIMEN: NORMAL
WHOLE BLOOD HOLD SPECIMEN: NORMAL

## 2017-06-04 PROCEDURE — 83605 ASSAY OF LACTIC ACID: CPT

## 2017-06-04 PROCEDURE — 96375 TX/PRO/DX INJ NEW DRUG ADDON: CPT

## 2017-06-04 PROCEDURE — 85025 COMPLETE CBC W/AUTO DIFF WBC: CPT

## 2017-06-04 PROCEDURE — 87040 BLOOD CULTURE FOR BACTERIA: CPT

## 2017-06-04 PROCEDURE — 94799 UNLISTED PULMONARY SVC/PX: CPT

## 2017-06-04 PROCEDURE — 25010000002 CEFTRIAXONE: Performed by: NURSE PRACTITIONER

## 2017-06-04 PROCEDURE — 94640 AIRWAY INHALATION TREATMENT: CPT

## 2017-06-04 PROCEDURE — 96365 THER/PROPH/DIAG IV INF INIT: CPT

## 2017-06-04 PROCEDURE — 0 IOPAMIDOL 61 % SOLUTION: Performed by: EMERGENCY MEDICINE

## 2017-06-04 PROCEDURE — 80053 COMPREHEN METABOLIC PANEL: CPT

## 2017-06-04 PROCEDURE — 71275 CT ANGIOGRAPHY CHEST: CPT

## 2017-06-04 PROCEDURE — 25010000002 METHYLPREDNISOLONE PER 125 MG: Performed by: EMERGENCY MEDICINE

## 2017-06-04 PROCEDURE — 71020 HC CHEST PA AND LATERAL: CPT

## 2017-06-04 PROCEDURE — 93005 ELECTROCARDIOGRAM TRACING: CPT

## 2017-06-04 PROCEDURE — 81003 URINALYSIS AUTO W/O SCOPE: CPT | Performed by: EMERGENCY MEDICINE

## 2017-06-04 PROCEDURE — 99284 EMERGENCY DEPT VISIT MOD MDM: CPT

## 2017-06-04 PROCEDURE — 96361 HYDRATE IV INFUSION ADD-ON: CPT

## 2017-06-04 RX ORDER — IBUPROFEN 600 MG/1
600 TABLET ORAL ONCE
Status: COMPLETED | OUTPATIENT
Start: 2017-06-04 | End: 2017-06-04

## 2017-06-04 RX ORDER — IPRATROPIUM BROMIDE AND ALBUTEROL SULFATE 2.5; .5 MG/3ML; MG/3ML
3 SOLUTION RESPIRATORY (INHALATION) ONCE
Status: COMPLETED | OUTPATIENT
Start: 2017-06-04 | End: 2017-06-04

## 2017-06-04 RX ORDER — METHYLPREDNISOLONE SODIUM SUCCINATE 125 MG/2ML
125 INJECTION, POWDER, LYOPHILIZED, FOR SOLUTION INTRAMUSCULAR; INTRAVENOUS ONCE
Status: COMPLETED | OUTPATIENT
Start: 2017-06-04 | End: 2017-06-04

## 2017-06-04 RX ORDER — SODIUM CHLORIDE 0.9 % (FLUSH) 0.9 %
10 SYRINGE (ML) INJECTION AS NEEDED
Status: DISCONTINUED | OUTPATIENT
Start: 2017-06-04 | End: 2017-06-05 | Stop reason: HOSPADM

## 2017-06-04 RX ORDER — ACETAMINOPHEN 325 MG/1
975 TABLET ORAL ONCE
Status: COMPLETED | OUTPATIENT
Start: 2017-06-04 | End: 2017-06-04

## 2017-06-04 RX ADMIN — SODIUM CHLORIDE 1000 ML: 9 INJECTION, SOLUTION INTRAVENOUS at 18:10

## 2017-06-04 RX ADMIN — HYDROCODONE POLISTIREX AND CHLORPHENIRAMINE POLISTIREX 5 ML: 10; 8 SUSPENSION, EXTENDED RELEASE ORAL at 19:23

## 2017-06-04 RX ADMIN — CEFTRIAXONE 1 G: 1 INJECTION, POWDER, FOR SOLUTION INTRAMUSCULAR; INTRAVENOUS at 19:43

## 2017-06-04 RX ADMIN — ACETAMINOPHEN 975 MG: 325 TABLET, FILM COATED ORAL at 19:22

## 2017-06-04 RX ADMIN — IOPAMIDOL 100 ML: 612 INJECTION, SOLUTION INTRAVENOUS at 21:29

## 2017-06-04 RX ADMIN — SODIUM CHLORIDE 2000 ML: 9 INJECTION, SOLUTION INTRAVENOUS at 19:42

## 2017-06-04 RX ADMIN — IPRATROPIUM BROMIDE AND ALBUTEROL SULFATE 3 ML: .5; 3 SOLUTION RESPIRATORY (INHALATION) at 17:56

## 2017-06-04 RX ADMIN — METHYLPREDNISOLONE SODIUM SUCCINATE 125 MG: 125 INJECTION, POWDER, FOR SOLUTION INTRAMUSCULAR; INTRAVENOUS at 19:23

## 2017-06-04 RX ADMIN — IBUPROFEN 600 MG: 600 TABLET ORAL at 19:22

## 2017-06-04 RX ADMIN — IPRATROPIUM BROMIDE AND ALBUTEROL SULFATE 3 ML: .5; 3 SOLUTION RESPIRATORY (INHALATION) at 19:06

## 2017-06-04 NOTE — ED PROVIDER NOTES
Subjective   HPI Comments: Patient presents to the emergency department with shortness of breath and increasing cough.  Further history reveals that the patient has been diagnosed with left lower lobe pneumonia in the recent 2 weeks.  She completed a course of doxycycline without improvement.  She did not know she had a fever on arrival.      History provided by:  Patient   used: No        Review of Systems   Constitutional: Positive for appetite change, chills, fatigue and fever. Negative for diaphoresis.   HENT: Negative for sore throat.    Eyes: Negative.  Negative for photophobia, pain and visual disturbance.   Respiratory: Positive for cough, chest tightness and wheezing. Negative for shortness of breath and stridor.    Cardiovascular: Negative.  Negative for chest pain.   Gastrointestinal: Negative.  Negative for abdominal pain, diarrhea, nausea and vomiting.   Endocrine: Negative.    Genitourinary: Negative.  Negative for dysuria.   Musculoskeletal: Negative.  Negative for back pain and neck pain.   Skin: Negative.  Negative for pallor and rash.   Allergic/Immunologic: Negative.    Neurological: Negative.  Negative for syncope and headaches.   Hematological: Negative.    Psychiatric/Behavioral: Negative.  Negative for agitation.   All other systems reviewed and are negative.      Past Medical History:   Diagnosis Date   • Asthma    • Cholelithiasis        Allergies   Allergen Reactions   • Codeine Nausea And Vomiting       Past Surgical History:   Procedure Laterality Date   • ADENOIDECTOMY     • EAR TUBES     • TONSILLECTOMY         Family History   Problem Relation Age of Onset   • No Known Problems Mother    • No Known Problems Father    • No Known Problems Sister    • No Known Problems Brother        Social History     Social History   • Marital status: Single     Spouse name: N/A   • Number of children: N/A   • Years of education: N/A     Social History Main Topics   • Smoking status:  Current Every Day Smoker     Packs/day: 0.50     Types: Cigarettes   • Smokeless tobacco: Never Used   • Alcohol use No   • Drug use: No   • Sexual activity: Defer     Other Topics Concern   • None     Social History Narrative   • None           Objective   Physical Exam   Constitutional: She is oriented to person, place, and time. She appears well-developed and well-nourished.   Vital signs are acknowledged.  Patient is febrile, tachycardic, hypotensive, and hypoxic on initial arrival   HENT:   Head: Normocephalic and atraumatic.   Right Ear: External ear normal.   Left Ear: External ear normal.   Mouth/Throat: Oropharynx is clear and moist.   Eyes: Conjunctivae and EOM are normal. Pupils are equal, round, and reactive to light.   Neck: Normal range of motion. Neck supple. No tracheal deviation present. No thyromegaly present.   Cardiovascular: Regular rhythm and normal heart sounds.  Exam reveals no gallop and no friction rub.    No murmur heard.  Pulmonary/Chest: No stridor. She is in respiratory distress. She has wheezes. She has rales. She exhibits tenderness.   Abdominal: Soft.   Musculoskeletal: Normal range of motion. She exhibits no edema, tenderness or deformity.   Neurological: She is alert and oriented to person, place, and time.   No meningeal signs   Skin: Skin is warm and dry. No rash noted. No erythema. No pallor.   Psychiatric: She has a normal mood and affect.   Vitals reviewed.      Procedures         ED Course  ED Course   Comment By Time   Patient is no longer febrile.  She has had 2 breathing treatments.  She has had a liter of fluid.  Her heart rate remains 110s to 120s.  Oxygen saturation on room air is 91-92%.  Review chest x-ray with Dr. Abraham and no fred infiltrate is apparent.  In light of the patient's vital signs, I am going to order a CT of the chest. JOY Nieto 06/04 2009   Discussed case with our hospitalist who recommends ICU admission.  We are out of ICU beds  currently.  Consultation with intensivist [facility] is pending Diana Bhatia, JOY 06/04 1904   Discussed case with Dr. Garrison, intensivist who agrees with plan of care for admission in ICU and accepts this patient.  Patient understands and concurs. Diana Bhatia, JOY 06/04 4670                  [facility]    Final diagnoses:   Pneumonia of both upper lobes due to infectious organism            JOY Nieto  06/04/17 3571       JOY Nieto  06/05/17 0030

## 2017-06-05 ENCOUNTER — APPOINTMENT (OUTPATIENT)
Dept: CARDIOLOGY | Facility: HOSPITAL | Age: 27
End: 2017-06-05

## 2017-06-05 ENCOUNTER — HOSPITAL ENCOUNTER (INPATIENT)
Facility: HOSPITAL | Age: 27
LOS: 2 days | Discharge: HOME | End: 2017-06-07
Attending: INTERNAL MEDICINE | Admitting: INTERNAL MEDICINE

## 2017-06-05 ENCOUNTER — APPOINTMENT (OUTPATIENT)
Dept: GENERAL RADIOLOGY | Facility: HOSPITAL | Age: 27
End: 2017-06-05

## 2017-06-05 VITALS
WEIGHT: 132 LBS | OXYGEN SATURATION: 94 % | DIASTOLIC BLOOD PRESSURE: 56 MMHG | RESPIRATION RATE: 16 BRPM | HEART RATE: 109 BPM | HEIGHT: 61 IN | SYSTOLIC BLOOD PRESSURE: 96 MMHG | TEMPERATURE: 98.3 F | BODY MASS INDEX: 24.92 KG/M2

## 2017-06-05 PROBLEM — F11.10 OPIOID ABUSE (HCC): Status: ACTIVE | Noted: 2017-06-05

## 2017-06-05 PROBLEM — J18.9 PNEUMONIA: Status: ACTIVE | Noted: 2017-06-05

## 2017-06-05 PROBLEM — Z72.0 TOBACCO ABUSE: Status: ACTIVE | Noted: 2017-06-05

## 2017-06-05 PROBLEM — A41.9 SEPSIS: Status: ACTIVE | Noted: 2017-06-05

## 2017-06-05 LAB
AMPHET+METHAMPHET UR QL: NEGATIVE
AMPHETAMINES UR QL: NEGATIVE
ARTERIAL PATENCY WRIST A: ABNORMAL
ATMOSPHERIC PRESS: ABNORMAL MMHG
BARBITURATES UR QL SCN: NEGATIVE
BASE EXCESS BLDA CALC-SCNC: -1.9 MMOL/L (ref 0–2)
BDY SITE: ABNORMAL
BENZODIAZ UR QL SCN: NEGATIVE
BH CV ECHO MEAS - AO MAX PG (FULL): 3.4 MMHG
BH CV ECHO MEAS - AO MAX PG: 10.6 MMHG
BH CV ECHO MEAS - AO ROOT AREA (BSA CORRECTED): 1.6
BH CV ECHO MEAS - AO ROOT AREA: 5.7 CM^2
BH CV ECHO MEAS - AO ROOT DIAM: 2.7 CM
BH CV ECHO MEAS - AO V2 MAX: 163 CM/SEC
BH CV ECHO MEAS - AVA(V,A): 2.6 CM^2
BH CV ECHO MEAS - AVA(V,D): 2.6 CM^2
BH CV ECHO MEAS - BSA(HAYCOCK): 1.7 M^2
BH CV ECHO MEAS - BSA: 1.6 M^2
BH CV ECHO MEAS - BZI_BMI: 27.4 KILOGRAMS/M^2
BH CV ECHO MEAS - BZI_METRIC_HEIGHT: 154.9 CM
BH CV ECHO MEAS - BZI_METRIC_WEIGHT: 65.8 KG
BH CV ECHO MEAS - CONTRAST EF (2CH): 63 ML/M^2
BH CV ECHO MEAS - CONTRAST EF 4CH: 59.4 ML/M^2
BH CV ECHO MEAS - EDV(CUBED): 87.5 ML
BH CV ECHO MEAS - EDV(MOD-SP2): 54 ML
BH CV ECHO MEAS - EDV(MOD-SP4): 64 ML
BH CV ECHO MEAS - EDV(TEICH): 89.6 ML
BH CV ECHO MEAS - EF(CUBED): 79.5 %
BH CV ECHO MEAS - EF(MOD-SP2): 63 %
BH CV ECHO MEAS - EF(MOD-SP4): 59.4 %
BH CV ECHO MEAS - EF(TEICH): 72 %
BH CV ECHO MEAS - ESV(CUBED): 18 ML
BH CV ECHO MEAS - ESV(MOD-SP2): 20 ML
BH CV ECHO MEAS - ESV(MOD-SP4): 26 ML
BH CV ECHO MEAS - ESV(TEICH): 25.1 ML
BH CV ECHO MEAS - FS: 41 %
BH CV ECHO MEAS - IVS/LVPW: 0.89
BH CV ECHO MEAS - IVSD: 0.76 CM
BH CV ECHO MEAS - LA DIMENSION: 2.7 CM
BH CV ECHO MEAS - LA/AO: 1
BH CV ECHO MEAS - LAT PEAK E' VEL: 23.5 CM/SEC
BH CV ECHO MEAS - LV DIASTOLIC VOL/BSA (35-75): 38.8 ML/M^2
BH CV ECHO MEAS - LV MASS(C)D: 112.6 GRAMS
BH CV ECHO MEAS - LV MASS(C)DI: 68.3 GRAMS/M^2
BH CV ECHO MEAS - LV MAX PG: 7.2 MMHG
BH CV ECHO MEAS - LV MEAN PG: 4 MMHG
BH CV ECHO MEAS - LV SYSTOLIC VOL/BSA (12-30): 15.8 ML/M^2
BH CV ECHO MEAS - LV V1 MAX: 134 CM/SEC
BH CV ECHO MEAS - LV V1 MEAN: 87.4 CM/SEC
BH CV ECHO MEAS - LV V1 VTI: 23.3 CM
BH CV ECHO MEAS - LVIDD: 4.4 CM
BH CV ECHO MEAS - LVIDS: 2.6 CM
BH CV ECHO MEAS - LVLD AP2: 7.8 CM
BH CV ECHO MEAS - LVLD AP4: 7.4 CM
BH CV ECHO MEAS - LVLS AP2: 5.6 CM
BH CV ECHO MEAS - LVLS AP4: 6.5 CM
BH CV ECHO MEAS - LVOT AREA (M): 3.1 CM^2
BH CV ECHO MEAS - LVOT AREA: 3.1 CM^2
BH CV ECHO MEAS - LVOT DIAM: 2 CM
BH CV ECHO MEAS - LVPWD: 0.86 CM
BH CV ECHO MEAS - MED PEAK E' VEL: 14.1 CM/SEC
BH CV ECHO MEAS - MV A MAX VEL: 85.8 CM/SEC
BH CV ECHO MEAS - MV E MAX VEL: 82.1 CM/SEC
BH CV ECHO MEAS - MV E/A: 0.96
BH CV ECHO MEAS - PA ACC SLOPE: 911 CM/SEC^2
BH CV ECHO MEAS - PA ACC TIME: 0.09 SEC
BH CV ECHO MEAS - PA MAX PG: 5.6 MMHG
BH CV ECHO MEAS - PA PR(ACCEL): 40.8 MMHG
BH CV ECHO MEAS - PA V2 MAX: 118 CM/SEC
BH CV ECHO MEAS - RVDD: 2.2 CM
BH CV ECHO MEAS - SI(CUBED): 42.2 ML/M^2
BH CV ECHO MEAS - SI(LVOT): 44.4 ML/M^2
BH CV ECHO MEAS - SI(MOD-SP2): 20.6 ML/M^2
BH CV ECHO MEAS - SI(MOD-SP4): 23.1 ML/M^2
BH CV ECHO MEAS - SI(TEICH): 39.1 ML/M^2
BH CV ECHO MEAS - SV(CUBED): 69.5 ML
BH CV ECHO MEAS - SV(LVOT): 73.2 ML
BH CV ECHO MEAS - SV(MOD-SP2): 34 ML
BH CV ECHO MEAS - SV(MOD-SP4): 38 ML
BH CV ECHO MEAS - SV(TEICH): 64.5 ML
BH CV ECHO MEAS - TAPSE (>1.6): 2.1 CM2
BH CV VAS BP RIGHT ARM: NORMAL MMHG
BH CV XLRA - RV BASE: 2.9 CM
BH CV XLRA - RV LENGTH: 7.9 CM
BH CV XLRA - RV MID: 2.4 CM
BH CV XLRA - TDI S': 19.2 CM/SEC
BUPRENORPHINE SERPL-MCNC: POSITIVE NG/ML
CA-I SERPL ISE-MCNC: 1.19 MMOL/L (ref 1.12–1.32)
CANNABINOIDS SERPL QL: NEGATIVE
CO2 BLDA-SCNC: 24.4 MMOL/L (ref 22–33)
COCAINE UR QL: NEGATIVE
COHGB MFR BLD: 2.3 % (ref 0–2)
E/E' RATIO: 4
HAV IGM SERPL QL IA: ABNORMAL
HBV CORE IGM SERPL QL IA: ABNORMAL
HBV SURFACE AG SERPL QL IA: ABNORMAL
HCO3 BLDA-SCNC: 23.2 MMOL/L (ref 20–26)
HCT VFR BLD CALC: 44.4 %
HCV AB SER DONR QL: REACTIVE
HGB BLDA-MCNC: 14.5 G/DL (ref 14–18)
HIV1+2 AB SER QL: NORMAL
HOROWITZ INDEX BLD+IHG-RTO: 28 %
LEFT ATRIUM VOLUME INDEX: 21 ML/M2
LEFT ATRIUM VOLUME: 35 CM3
LV EF 2D ECHO EST: 60 %
MAGNESIUM SERPL-MCNC: 2.1 MG/DL (ref 1.3–2.7)
METHADONE UR QL SCN: NEGATIVE
METHGB BLD QL: 0.6 % (ref 0–1.5)
MODALITY: ABNORMAL
OPIATES UR QL: POSITIVE
OXYCODONE UR QL SCN: POSITIVE
OXYHGB MFR BLDV: 91.5 % (ref 94–99)
PCO2 BLDA: 39.6 MM HG (ref 35–45)
PCP UR QL SCN: NEGATIVE
PH BLDA: 7.38 PH UNITS (ref 7.35–7.45)
PHOSPHATE SERPL-MCNC: 1.8 MG/DL (ref 2.4–5.1)
PO2 BLDA: 66.1 MM HG (ref 83–108)
PROCALCITONIN SERPL-MCNC: 0.07 NG/ML
PROPOXYPH UR QL: NEGATIVE
TRICYCLICS UR QL SCN: NEGATIVE

## 2017-06-05 PROCEDURE — G0432 EIA HIV-1/HIV-2 SCREEN: HCPCS | Performed by: INTERNAL MEDICINE

## 2017-06-05 PROCEDURE — 80074 ACUTE HEPATITIS PANEL: CPT | Performed by: INTERNAL MEDICINE

## 2017-06-05 PROCEDURE — 25010000002 AZITHROMYCIN: Performed by: NURSE PRACTITIONER

## 2017-06-05 PROCEDURE — 36600 WITHDRAWAL OF ARTERIAL BLOOD: CPT | Performed by: NURSE PRACTITIONER

## 2017-06-05 PROCEDURE — 87522 HEPATITIS C REVRS TRNSCRPJ: CPT | Performed by: INTERNAL MEDICINE

## 2017-06-05 PROCEDURE — 25010000002 METHYLPREDNISOLONE PER 125 MG: Performed by: INTERNAL MEDICINE

## 2017-06-05 PROCEDURE — 99291 CRITICAL CARE FIRST HOUR: CPT | Performed by: INTERNAL MEDICINE

## 2017-06-05 PROCEDURE — 99233 SBSQ HOSP IP/OBS HIGH 50: CPT | Performed by: INTERNAL MEDICINE

## 2017-06-05 PROCEDURE — 93306 TTE W/DOPPLER COMPLETE: CPT | Performed by: INTERNAL MEDICINE

## 2017-06-05 PROCEDURE — 84145 PROCALCITONIN (PCT): CPT | Performed by: NURSE PRACTITIONER

## 2017-06-05 PROCEDURE — 83735 ASSAY OF MAGNESIUM: CPT | Performed by: NURSE PRACTITIONER

## 2017-06-05 PROCEDURE — 25010000002 KETOROLAC TROMETHAMINE PER 15 MG: Performed by: NURSE PRACTITIONER

## 2017-06-05 PROCEDURE — 82330 ASSAY OF CALCIUM: CPT | Performed by: NURSE PRACTITIONER

## 2017-06-05 PROCEDURE — 93306 TTE W/DOPPLER COMPLETE: CPT

## 2017-06-05 PROCEDURE — 71010 HC CHEST PA OR AP: CPT

## 2017-06-05 PROCEDURE — 94799 UNLISTED PULMONARY SVC/PX: CPT

## 2017-06-05 PROCEDURE — 94640 AIRWAY INHALATION TREATMENT: CPT

## 2017-06-05 PROCEDURE — 94760 N-INVAS EAR/PLS OXIMETRY 1: CPT

## 2017-06-05 PROCEDURE — 82805 BLOOD GASES W/O2 SATURATION: CPT | Performed by: NURSE PRACTITIONER

## 2017-06-05 PROCEDURE — 80306 DRUG TEST PRSMV INSTRMNT: CPT | Performed by: NURSE PRACTITIONER

## 2017-06-05 PROCEDURE — 84100 ASSAY OF PHOSPHORUS: CPT | Performed by: NURSE PRACTITIONER

## 2017-06-05 PROCEDURE — 25010000002 ENOXAPARIN PER 10 MG: Performed by: NURSE PRACTITIONER

## 2017-06-05 PROCEDURE — 25010000003 CEFTRIAXONE PER 250 MG: Performed by: NURSE PRACTITIONER

## 2017-06-05 RX ORDER — IPRATROPIUM BROMIDE AND ALBUTEROL SULFATE 2.5; .5 MG/3ML; MG/3ML
3 SOLUTION RESPIRATORY (INHALATION) EVERY 4 HOURS PRN
Status: DISCONTINUED | OUTPATIENT
Start: 2017-06-05 | End: 2017-06-05

## 2017-06-05 RX ORDER — ACETAMINOPHEN 650 MG/1
650 SUPPOSITORY RECTAL EVERY 4 HOURS PRN
Status: DISCONTINUED | OUTPATIENT
Start: 2017-06-05 | End: 2017-06-05

## 2017-06-05 RX ORDER — IPRATROPIUM BROMIDE AND ALBUTEROL SULFATE 2.5; .5 MG/3ML; MG/3ML
3 SOLUTION RESPIRATORY (INHALATION)
Status: DISCONTINUED | OUTPATIENT
Start: 2017-06-05 | End: 2017-06-07 | Stop reason: HOSPADM

## 2017-06-05 RX ORDER — FAMOTIDINE 20 MG/1
20 TABLET, FILM COATED ORAL DAILY
Status: DISCONTINUED | OUTPATIENT
Start: 2017-06-05 | End: 2017-06-05

## 2017-06-05 RX ORDER — NICOTINE 21 MG/24HR
1 PATCH, TRANSDERMAL 24 HOURS TRANSDERMAL EVERY 24 HOURS
Status: DISCONTINUED | OUTPATIENT
Start: 2017-06-05 | End: 2017-06-06 | Stop reason: SDUPTHER

## 2017-06-05 RX ORDER — HYDROCODONE BITARTRATE AND ACETAMINOPHEN 5; 325 MG/1; MG/1
1 TABLET ORAL EVERY 6 HOURS PRN
Status: DISCONTINUED | OUTPATIENT
Start: 2017-06-05 | End: 2017-06-07 | Stop reason: HOSPADM

## 2017-06-05 RX ORDER — CEFTRIAXONE SODIUM 1 G/50ML
1 INJECTION, SOLUTION INTRAVENOUS EVERY 24 HOURS
Status: DISCONTINUED | OUTPATIENT
Start: 2017-06-05 | End: 2017-06-07 | Stop reason: HOSPADM

## 2017-06-05 RX ORDER — PANTOPRAZOLE SODIUM 40 MG/1
40 TABLET, DELAYED RELEASE ORAL
Status: DISCONTINUED | OUTPATIENT
Start: 2017-06-05 | End: 2017-06-07 | Stop reason: HOSPADM

## 2017-06-05 RX ORDER — ONDANSETRON 2 MG/ML
4 INJECTION INTRAMUSCULAR; INTRAVENOUS EVERY 6 HOURS PRN
Status: DISCONTINUED | OUTPATIENT
Start: 2017-06-05 | End: 2017-06-07 | Stop reason: HOSPADM

## 2017-06-05 RX ORDER — BUDESONIDE AND FORMOTEROL FUMARATE DIHYDRATE 160; 4.5 UG/1; UG/1
2 AEROSOL RESPIRATORY (INHALATION)
Status: DISCONTINUED | OUTPATIENT
Start: 2017-06-05 | End: 2017-06-07 | Stop reason: HOSPADM

## 2017-06-05 RX ORDER — SODIUM CHLORIDE 0.9 % (FLUSH) 0.9 %
1-10 SYRINGE (ML) INJECTION AS NEEDED
Status: DISCONTINUED | OUTPATIENT
Start: 2017-06-05 | End: 2017-06-07 | Stop reason: HOSPADM

## 2017-06-05 RX ORDER — METHYLPREDNISOLONE SODIUM SUCCINATE 125 MG/2ML
62.5 INJECTION, POWDER, LYOPHILIZED, FOR SOLUTION INTRAMUSCULAR; INTRAVENOUS EVERY 12 HOURS
Status: COMPLETED | OUTPATIENT
Start: 2017-06-05 | End: 2017-06-06

## 2017-06-05 RX ORDER — KETOROLAC TROMETHAMINE 30 MG/ML
30 INJECTION, SOLUTION INTRAMUSCULAR; INTRAVENOUS EVERY 6 HOURS PRN
Status: DISCONTINUED | OUTPATIENT
Start: 2017-06-05 | End: 2017-06-05

## 2017-06-05 RX ORDER — ACETAMINOPHEN 325 MG/1
650 TABLET ORAL EVERY 4 HOURS PRN
Status: DISCONTINUED | OUTPATIENT
Start: 2017-06-05 | End: 2017-06-07 | Stop reason: HOSPADM

## 2017-06-05 RX ADMIN — IPRATROPIUM BROMIDE AND ALBUTEROL SULFATE 3 ML: .5; 3 SOLUTION RESPIRATORY (INHALATION) at 15:40

## 2017-06-05 RX ADMIN — BUDESONIDE AND FORMOTEROL FUMARATE DIHYDRATE 2 PUFF: 160; 4.5 AEROSOL RESPIRATORY (INHALATION) at 21:24

## 2017-06-05 RX ADMIN — KETOROLAC TROMETHAMINE 30 MG: 30 INJECTION, SOLUTION INTRAMUSCULAR at 08:57

## 2017-06-05 RX ADMIN — METHYLPREDNISOLONE SODIUM SUCCINATE 62.5 MG: 125 INJECTION, POWDER, FOR SOLUTION INTRAMUSCULAR; INTRAVENOUS at 13:53

## 2017-06-05 RX ADMIN — HYDROCODONE BITARTRATE AND ACETAMINOPHEN 1 TABLET: 5; 325 TABLET ORAL at 22:09

## 2017-06-05 RX ADMIN — FAMOTIDINE 20 MG: 20 TABLET ORAL at 08:58

## 2017-06-05 RX ADMIN — KETOROLAC TROMETHAMINE 30 MG: 30 INJECTION, SOLUTION INTRAMUSCULAR at 01:42

## 2017-06-05 RX ADMIN — ENOXAPARIN SODIUM 40 MG: 40 INJECTION SUBCUTANEOUS at 05:54

## 2017-06-05 RX ADMIN — SODIUM PHOSPHATE, MONOBASIC, MONOHYDRATE AND SODIUM PHOSPHATE, DIBASIC, ANHYDROUS 30 MMOL: 276; 142 INJECTION, SOLUTION INTRAVENOUS at 13:53

## 2017-06-05 RX ADMIN — NICOTINE 1 PATCH: 14 PATCH TRANSDERMAL at 02:00

## 2017-06-05 RX ADMIN — HYDROCODONE BITARTRATE AND ACETAMINOPHEN 1 TABLET: 5; 325 TABLET ORAL at 15:07

## 2017-06-05 RX ADMIN — IPRATROPIUM BROMIDE AND ALBUTEROL SULFATE 3 ML: .5; 3 SOLUTION RESPIRATORY (INHALATION) at 12:36

## 2017-06-05 RX ADMIN — CEFTRIAXONE SODIUM 1 G: 1 INJECTION, SOLUTION INTRAVENOUS at 22:09

## 2017-06-05 RX ADMIN — IPRATROPIUM BROMIDE AND ALBUTEROL SULFATE 3 ML: .5; 3 SOLUTION RESPIRATORY (INHALATION) at 21:24

## 2017-06-05 RX ADMIN — AZITHROMYCIN 500 MG: 500 INJECTION, POWDER, LYOPHILIZED, FOR SOLUTION INTRAVENOUS at 02:00

## 2017-06-05 RX ADMIN — IPRATROPIUM BROMIDE AND ALBUTEROL SULFATE 3 ML: .5; 3 SOLUTION RESPIRATORY (INHALATION) at 08:50

## 2017-06-05 RX ADMIN — PANTOPRAZOLE SODIUM 40 MG: 40 TABLET, DELAYED RELEASE ORAL at 13:53

## 2017-06-05 NOTE — PLAN OF CARE
Problem: Pneumonia (Adult)  Intervention: Maximize Oxygenation/Ventilation/Perfusion    06/05/17 0111   Positioning   Head Of Bed (HOB) Position HOB at 30-45 degrees   Activity   Activity Type activity adjusted per tolerance   Promote Aggressive Pulmonary Hygiene/Secretion Management   Cough And Deep Breathing done independently per patient   Respiratory Interventions   Airway/Ventilation Management airway patency maintained;calming measures promoted;pulmonary hygiene promoted       Intervention: Prevent/Manage Infection Progression    06/05/17 0111   Safety Interventions   Infection Prevention visitors restricted/screened   Isolation Precautions protective environment initiated   Safety Interventions   Infection Management aseptic technique maintained       Intervention: Monitor/Manage Fluid Electrolyte Balance    06/05/17 0111   Positioning   Positioning semi-Fowlers   Nutrition Interventions   Fluid/Electrolyte Management intravenous fluid replacement initiated         Goal: Signs and Symptoms of Listed Potential Problems Will be Absent or Manageable (Pneumonia)  Outcome: Ongoing (interventions implemented as appropriate)    06/05/17 0111   Pneumonia   Problems Assessed (Pneumonia) all   Problems Present (Pneumonia) respiratory compromise

## 2017-06-05 NOTE — PROGRESS NOTES
Discharge Planning Assessment  [facility]     Patient Name: Charity Luna  MRN: [id number]  Today's Date: 6/5/2017    Admit Date: 6/5/2017          Discharge Needs Assessment     None            Discharge Plan       06/05/17 1155    Case Management/Social Work Plan    Plan Home    Patient/Family In Agreement With Plan yes    Additional Comments CM met with pt in room about initial discharge planning, pt lives with her grandmother in [facility] in a 1 level home with 1 step to go into home. Pt has never had HH, DME or 02. Pt did have + drug screen for suboxone, opiates, and oxycodone.  I will notify [place] S.NATA        Discharge Placement     No information found                Demographic Summary       06/05/17 1148    Referral Information    Admission Type inpatient    Arrived From admitted as an inpatient    Referral Source admission list    Reason For Consult discharge planning    Record Reviewed clinical discipline documentation;history and physical    Contact Information    Permission Granted to Share Information With     Primary Care Physician Information    Name Amber Castillo            Functional Status       06/05/17 1149    Functional Status Prior    Ambulation 0-->independent    Transferring 0-->independent    Toileting 0-->independent    Bathing 0-->independent    Dressing 0-->independent    Eating 0-->independent    Communication 0-->understands/communicates without difficulty    Swallowing 0-->swallows foods/liquids without difficulty    IADL    Medications independent    Meal Preparation independent    Housekeeping independent    Laundry independent    Shopping independent    Oral Care independent    Activity Tolerance    Usual Activity Tolerance excellent    Employment/Financial    Financial Concerns --   Pt states she has medication coverage thru [employer].            Psychosocial     None            Abuse/Neglect     None            Legal     None            Substance Abuse      None            Patient Forms     None          Sonrda Gonzales RN

## 2017-06-05 NOTE — ED NOTES
Dr. Garrison from [facility] called for JOY Bhatia.     Nelli Escalera  06/04/17 1790

## 2017-06-05 NOTE — PAYOR COMM NOTE
"Charity Stark (27 y.o. Female) Initial notification of inpatient admission.   1990    Date of Birth Social Security Number Address Home Phone MRN    1990  [address] [facility] [address] [phone] [id number]    Christian Marital Status          None Single       Admission Date Admission Type Admitting Provider Attending Provider Department, Room/Bed    17 Urgent Raoul Garrison MD Mueller, Joseph C, MD [facility] 2B ICU, N228/    Discharge Date Discharge Disposition Discharge Destination                      Attending Provider: Raoul Garrison MD     Allergies:  Codeine    Isolation:  None   Infection:  None   Code Status:  FULL    Ht:  61\" (154.9 cm)   Wt:  145 lb (65.8 kg)    Admission Cmt:  None   Principal Problem:  Pneumonia [J18.9]                 Active Insurance as of 2017     Primary Coverage     Payor Plan Insurance Group Employer/Plan Group    [employer] MEDICAID [employer] [id number]     Payor Plan Address Payor Plan Phone Number Effective From Effective To    PO  [phone] 2014     [place] [address]       Subscriber Name Subscriber Birth Date Member ID       CHARITY STARK 1990 [id number]                 Emergency Contacts      (Rel.) Home Phone Work Phone Mobile Phone    Trino Stark (Father) [phone] -- --               History & Physical      Raoul Garrison MD at 2017  1:03 AM          Pulmonary/Critical Care History and Physical Exam     LOS: 0 days   Patient Care Team:  JOY Vasquez as PCP - General (Family Medicine)    Chief Complaint:  No chief complaint on file.      Subjective     HPI: Charity Stark is a 27 y.o. female who  has a past medical history of Asthma and Cholelithiasis.   She reports a history of respiratory illness over the past 1-1/2-2 months.  It has been worse over the past 3 weeks H/O F/C, L pleuritic pain, cough, n/v, and nocturnal diaphoresis.  " She had initially seen her PCP who placed her on azithromycin.  She subsequently reports she was prescribed Amoxicilllin by a nurse practitioner in the emergency department.  She did not improve and so presented to [facility] ED on 5/15 and was placed on a 7 day course of doxycycline which she completed.  She has failed to have symptom resolution and so presented back to [facility].     The patient does report a history of gastroesophageal reflux.  She is an active smoker and has not stopped during this illness.  She reports she lives in a home with 2 dogs and 2 cats.  She does not know of any mold exposure.  She has no known exposure to tuberculosis.  She does report a history of asthma since childhood.  She denies alcohol or illicit drug use, although her drug screen was positive for Suboxone, opiates and oxycodone.  She had a CT angiogram of the chest prior to transfer here which was negative for pulmonary embolism.  It did show upper lobe predominant groundglass infiltrates.    History taken from: patient    Past Medical History:   Diagnosis Date   • Asthma    • Cholelithiasis        Past Surgical History:   Procedure Laterality Date   • ADENOIDECTOMY     • CHOLECYSTECTOMY     • EAR TUBES     • TONSILLECTOMY         Family History   Problem Relation Age of Onset   • Diabetes Mother    • Heart failure Mother    • Asthma Mother    • COPD Mother    • Heart failure Father    • Hypertension Father    • Hyperlipidemia Father    • No Known Problems Sister    • No Known Problems Brother        Social History     Social History   • Marital status: Single     Spouse name: N/A   • Number of children: N/A   • Years of education: N/A     Occupational History   • Not on file.     Social History Main Topics   • Smoking status: Current Every Day Smoker     Packs/day: 0.50     Types: Cigarettes   • Smokeless tobacco: Never Used   • Alcohol use No   • Drug use: No   • Sexual activity: Defer     Other Topics Concern   • Not on file     Social  History Narrative    Currently unemployed, used to work in a shop       Allergies   Allergen Reactions   • Codeine Nausea And Vomiting       PMH/FH/SocH were reviewed by me and updates were made.     Review of Systems:    All systems were reviewed and negative except as noted in subjective.  Constitution:  positive for See HPI  Respiratory: positive for  See HPI    Objective     Vital Signs  Temp:  [98.2 °F (36.8 °C)-101.4 °F (38.6 °C)] 98.2 °F (36.8 °C)  Heart Rate:  [] 114  Resp:  [16-26] 26  BP: ()/(47-79) 108/68    Physical Exam:     General Appearance:    Alert, cooperative, in no acute distress   Head:    Normocephalic, without obvious abnormality, atraumatic   Eyes:            Lids and lashes normal, conjunctivae and sclerae normal, no   icterus, no pallor, corneas clear, PERRLA   ENMT:   Ears appear intact with no abnormalities noted      No oral lesions, no thrush, oral mucosa moist   Neck:   No adenopathy, supple, trachea midline, no thyromegaly, no   carotid bruit, no JVD       Lungs/resp:     Normal effort, symmetric chest rise, no crepitus, clear to      auscultation bilaterally, no chest wall tenderness, resonant to percussion throughout.                  Heart/CV:    Regular rhythm and normal rate, normal S1 and S2, no            murmur, no gallop, no rub, no click   Abdomen/GI:     Normal bowel sounds, no masses, no organomegaly, soft        non-tender, non-distended, no guarding, no rebound                tenderness   G/U:     Deferred   Extremities/MSK:   No clubbing, cyanosis or edema.  No deformities.    Pulses:   Pulses palpable and equal bilaterally   Skin:   No bleeding, bruising or rash   Hem/Lymph:   No cervical or supraclavicular adenopathy.    Neurologic:   Moves all extremities with no obvious focal motor deficit.  Cranial nerves 2 - 12 grossly intact            Psychiatric:  Normal mood and affect, oriented x 3.      Results Review:     I reviewed the patient's new clinical  results.     Results from last 7 days  Lab Units 06/04/17  1755   SODIUM mmol/L 134*   POTASSIUM mmol/L 4.5   CHLORIDE mmol/L 99   TOTAL CO2 mmol/L 24.0*   BUN mg/dL 9   CREATININE mg/dL 0.70   CALCIUM mg/dL 9.0   BILIRUBIN mg/dL 1.1   ALK PHOS U/L 85   ALT (SGPT) U/L 88*   AST (SGOT) U/L 50*   GLUCOSE mg/dL 107*       Results from last 7 days  Lab Units 06/04/17  1755   WBC 10*3/mm3 15.55*   HEMOGLOBIN g/dL 15.3   HEMATOCRIT % 44.9   PLATELETS 10*3/mm3 225               I reviewed the patient's new imaging including images and reports.    CT of the Chest  Impression:     Negative for pulmonary embolism     Bilateral groundglass opacities, likely interstitial pneumonia with borderline mediastinal lymph nodes.      She also had multiple bilateral nodules, some of which were calcified and felt to represent granulomas.      Medication Review:          Assessment/Plan     Hospital Problem List     * (Principal)Pneumonia    Asthma    Tobacco abuse    Opioid abuse    Sepsis        27-year-old female with fevers, productive cough, dyspnea, bilateral upper lobe predominant groundglass opacities and borderline lymph nodes felt to represent pneumonia.    Drug screen has come back positive for Suboxone, opiates and oxycodone.  She is an active smoker.    The patient also has multiple lung nodules, most likely representing granulomas, though cannot completely exclude septic emboli.    Plan:  - ICU  - BD  - sputum Cx, BC x2 drawn in R  - HCG, UDS  - Rocephin/Azithromycin  - GI & DVT PPX  - NSAIDS for pleuritic pain  - patient denies illicit drug use, however UDS + for suboxone, and oxycodone  - ECHO in am  - Smoking cessation counseling  - Likely will need social work consult given suspected drug abuse.      Raoul Garrison MD  06/05/17  3:40 AM    40 minutes of critical care provided. This time excludes other billable procedures. Time does include preparation of documents, medical consultations, review of old records, and  direct bedside care. Patient was at high risk for life-threatening deterioration due to sepsis.            Electronically signed by Raoul Garrison MD at 6/5/2017  3:45 AM

## 2017-06-05 NOTE — PLAN OF CARE
Problem: Patient Care Overview (Adult)  Goal: Plan of Care Review  Outcome: Ongoing (interventions implemented as appropriate)    06/05/17 1400   Coping/Psychosocial Response Interventions   Plan Of Care Reviewed With patient       Goal: Adult Individualization and Mutuality  Outcome: Ongoing (interventions implemented as appropriate)    Problem: Pneumonia (Adult)  Goal: Signs and Symptoms of Listed Potential Problems Will be Absent or Manageable (Pneumonia)  Outcome: Ongoing (interventions implemented as appropriate)

## 2017-06-05 NOTE — PROGRESS NOTES
Adult Nutrition  Assessment/PES    Patient Name:  Charity Luna  YOB: 1990  MRN: [id number]  Admit Date:  6/5/2017    Assessment Date:  6/5/2017        Reason for Assessment       06/05/17 1401    Reason for Assessment    Reason For Assessment/Visit multidisciplinary rounds;nurse/nurse practitioner consult    Identified At Risk By Screening Criteria MST SCORE 2+;unintentional loss of 10 lbs or more in the past 2 mos    Time Spent (min) 30    Pulmonary/Critical Care Pneumonia    Substance Use Drug/illicit/recreational              Nutrition/Diet History       06/05/17 1416    Nutrition/Diet History    Reported/Observed By Patient;Family    Appetite Other   Per pt appetite has been decreased over the last 2 months with PNA. Appetite slightly better lately.    Food Habit/Preferences Other   Pt reports no food allergies.            Anthropometrics       06/05/17 1425    Usual Body Weight (UBW)    Usual Body Weight 70.8 kg (156 lb)    Weight Loss 4.99 kg (11 lb)    Weight Loss Time Frame 4 months, pt states since PNA Dx in March she has had a decrease in appetite.      06/05/17 1420    Anthropometrics    RD Documented Weight on Admission 65.8 kg (145 lb)    Anthropometrics (Special Considerations)    RD Calculated     RD Calculated BMI (kg/m2) 27.39            Labs/Tests/Procedures/Meds       06/05/17 1424    Labs/Tests/Procedures/Meds    Labs/Tests Review Reviewed     Results from last 7 days  Lab Units 06/04/17  1755   SODIUM mmol/L 134*   POTASSIUM mmol/L 4.5   CHLORIDE mmol/L 99   TOTAL CO2 mmol/L 24.0*   BUN mg/dL 9   CREATININE mg/dL 0.70   CALCIUM mg/dL 9.0   BILIRUBIN mg/dL 1.1   ALK PHOS U/L 85   ALT (SGPT) U/L 88*   AST (SGOT) U/L 50*   GLUCOSE mg/dL 107*                  Nutrition Prescription Ordered       06/05/17 1425    Nutrition Prescription PO    Current PO Diet Regular            Evaluation of Received Nutrient/Fluid Intake       06/05/17 1425    PO Evaluation    Number of  Days PO Intake Evaluated Insufficient Data              Problem/Interventions:        Problem 1       06/05/17 1435    Nutrition Diagnoses Problem 1    Problem 1 Unintended Weight Loss    Etiology (related to) Medical Diagnosis    Pulmonary/Critical Care Pneumonia    Signs/Symptoms (evidenced by) Report of Mnimal PO Intake                    Intervention Goal       06/05/17 1436    Intervention Goal    General Nutrition support treatment    PO Establish PO            Nutrition Intervention       06/05/17 1436    Nutrition Intervention    RD/Tech Action Advise alternate selection;Interview for preference;Menu provided;Encourage intake;Follow Tx progress;Care plan reviewd;Supplement offered/refused              Education/Evaluation       06/05/17 1436    Monitor/Evaluation    Monitor Per protocol;Pertinent labs;PO intake;Weight        Comments:      Electronically signed by:  Nena Renee  06/05/17 2:37 PM

## 2017-06-05 NOTE — ED NOTES
Hospitalist at [facility] paged via Chanell at [facility] for JOY Bhatia.     Nelli Escalera  06/04/17 0872

## 2017-06-05 NOTE — PROGRESS NOTES
"INTENSIVIST   PROGRESS NOTE     Hospital:  LOS: 0 days     Ms. Charity Luna, 27 y.o. female is followed for:      Pneumonia    Asthma    Tobacco abuse    As an Intensivist, we provide an integrated approach to the ICU patient and family, medical management of comorbid conditions, lead interdisciplinary rounds and coordinate the care with all other services, including those from other specialists.     Subjective   S     Interval History:  No new acute events overnight   She feels better.  No respiratory distress.     The patient's relevant past medical, surgical and social history were reviewed and updated in Epic as appropriate.      ROS:   No fevers.  No chest pain.  No nausea, vomiting or diarrhea.    Objective   O     Blood pressure 111/69, pulse 93, temperature 98.2 °F (36.8 °C), temperature source Oral, resp. rate 20, height 61\" (154.9 cm), weight 145 lb (65.8 kg), last menstrual period 05/28/2017, SpO2 91 %.     Tmax.: Temp:  [98.2 °F (36.8 °C)-101.4 °F (38.6 °C)] 98.2 °F (36.8 °C)         Flow (L/min): 2       Physical Examination  Telemetry:  Sinus Rhythm: sinus tachycardia   Constitutional:  No acute distress.   HEENT: Normocephalic and atraumatic.   Cardiovascular: Normal rate, regular and rhythm. Normal heart sounds.  No murmurs, gallop or rub.  No peripheral edema.   Respiratory: No respiratory distress. Normal respiratory effort.  Normal breath sounds  Wheezes   Abdominal:  Soft. No masses. Non-tender. No distension. No HSM.   Extremities: No digital cyanosis. No clubbing.   Neurological:   Alert and Oriented to person, place, and time.   Psychiatric:  Normal affect. Normal behavior.   Lines/Drains/Airways: Peripheral       Results from last 7 days  Lab Units 06/04/17  1755   WBC 10*3/mm3 15.55*   HEMOGLOBIN g/dL 15.3   MCV fL 92.2   PLATELETS 10*3/mm3 225       Results from last 7 days  Lab Units 06/05/17  0434 06/04/17  1755   SODIUM mmol/L  --  134*   POTASSIUM mmol/L  --  4.5   TOTAL CO2 " mmol/L  --  24.0*   CREATININE mg/dL  --  0.70   MAGNESIUM mg/dL 2.1  --    PHOSPHORUS mg/dL 1.8*  --      Estimated Creatinine Clearance: 105.7 mL/min (by C-G formula based on Cr of 0.7).    Results from last 7 days  Lab Units 06/04/17  1755   ALK PHOS U/L 85   BILIRUBIN mg/dL 1.1   ALT (SGPT) U/L 88*   AST (SGOT) U/L 50*       Results from last 7 days  Lab Units 06/05/17  0349   PH, ARTERIAL pH units 7.375   PCO2, ARTERIAL mm Hg 39.6   PO2 ART mm Hg 66.1*   FIO2 % 28     Lab Results   Component Value Date    HEPAIGM Non-Reactive 06/05/2017    HEPBIGMCORE Non-Reactive 06/05/2017    HEPCAB Reactive (C) 06/05/2017     Lab Results   Component Value Date    LACTATE 0.9 06/04/2017     Lab Results   Component Value Date    PROCALCITO 0.07 06/05/2017     Lab Results   Component Value Date    BLOODCX No growth at less than 24 hours 06/04/2017    BLOODCX No growth at less than 24 hours 06/04/2017     No results found for: URINECX    No results found for: RESPCX    Images:  CXR 6/5/2017 Minimal increased marking at the left lung base.     ECHO: Pending  Results: Reviewed.  I reviewed the patient's new laboratory and imaging results.  I independently reviewed the patient's new images.    Medications: Reviewed.    Assessment/Plan   A / P     27 y.o.female, admitted on 6/5/2017 with Pulmonary infiltrate [R91.8]  Pneumonia [J18.9]:     1. Probable Pneumonia  1. CT: Bilateral GGO  2. Asthma with exacerbation  3. Tobacco use  4. Opioid use  5. Hepatitis C Positive  6. Hypophosphatemia  7. GERD  8. Abs: Ceftriazone + Azithromycin    Nutrition:   Diet Regular  Advance Directives: Full Code    Assessment / Plan:    1. Bronchodilators  2. IV steroids  3. ECHO  4. Replace Phosphorus  5. Change H2RA to PPI  6. Labs/CXR in AM.  7. Quit smoking  8. Floor soon if doing OK    Plan of care and goals reviewed during interdisciplinary rounds.  I discussed the patient's findings and my recommendations with patient and nursing staff    Amado  MD Braulio, FACP, FCCP, [facility]    Intensive Care Medicine and Pulmonary Medicine

## 2017-06-05 NOTE — ED NOTES
EMS here to transport patient to [facility].     Nelli Escalera  06/04/17 [id number]

## 2017-06-05 NOTE — H&P
Pulmonary/Critical Care History and Physical Exam     LOS: 0 days   Patient Care Team:  JOY Vasquez as PCP - General (Family Medicine)    Chief Complaint:  No chief complaint on file.      Subjective     HPI: Charity Luna is a 27 y.o. female who  has a past medical history of Asthma and Cholelithiasis.   She reports a history of respiratory illness over the past 1-1/2-2 months.  It has been worse over the past 3 weeks H/O F/C, L pleuritic pain, cough, n/v, and nocturnal diaphoresis.  She had initially seen her PCP who placed her on azithromycin.  She subsequently reports she was prescribed Amoxicilllin by a nurse practitioner in the emergency department.  She did not improve and so presented to [facility] ED on 5/15 and was placed on a 7 day course of doxycycline which she completed.  She has failed to have symptom resolution and so presented back to [facility].     The patient does report a history of gastroesophageal reflux.  She is an active smoker and has not stopped during this illness.  She reports she lives in a home with 2 dogs and 2 cats.  She does not know of any mold exposure.  She has no known exposure to tuberculosis.  She does report a history of asthma since childhood.  She denies alcohol or illicit drug use, although her drug screen was positive for Suboxone, opiates and oxycodone.  She had a CT angiogram of the chest prior to transfer here which was negative for pulmonary embolism.  It did show upper lobe predominant groundglass infiltrates.    History taken from: patient    Past Medical History:   Diagnosis Date   • Asthma    • Cholelithiasis        Past Surgical History:   Procedure Laterality Date   • ADENOIDECTOMY     • CHOLECYSTECTOMY     • EAR TUBES     • TONSILLECTOMY         Family History   Problem Relation Age of Onset   • Diabetes Mother    • Heart failure Mother    • Asthma Mother    • COPD Mother    • Heart failure Father    • Hypertension Father    • Hyperlipidemia Father    • No  Known Problems Sister    • No Known Problems Brother        Social History     Social History   • Marital status: Single     Spouse name: N/A   • Number of children: N/A   • Years of education: N/A     Occupational History   • Not on file.     Social History Main Topics   • Smoking status: Current Every Day Smoker     Packs/day: 0.50     Types: Cigarettes   • Smokeless tobacco: Never Used   • Alcohol use No   • Drug use: No   • Sexual activity: Defer     Other Topics Concern   • Not on file     Social History Narrative    Currently unemployed, used to work in a shop       Allergies   Allergen Reactions   • Codeine Nausea And Vomiting       PMH/FH/SocH were reviewed by me and updates were made.     Review of Systems:    All systems were reviewed and negative except as noted in subjective.  Constitution:  positive for See HPI  Respiratory: positive for  See HPI    Objective     Vital Signs  Temp:  [98.2 °F (36.8 °C)-101.4 °F (38.6 °C)] 98.2 °F (36.8 °C)  Heart Rate:  [] 114  Resp:  [16-26] 26  BP: ()/(47-79) 108/68    Physical Exam:     General Appearance:    Alert, cooperative, in no acute distress   Head:    Normocephalic, without obvious abnormality, atraumatic   Eyes:            Lids and lashes normal, conjunctivae and sclerae normal, no   icterus, no pallor, corneas clear, PERRLA   ENMT:   Ears appear intact with no abnormalities noted      No oral lesions, no thrush, oral mucosa moist   Neck:   No adenopathy, supple, trachea midline, no thyromegaly, no   carotid bruit, no JVD       Lungs/resp:     Normal effort, symmetric chest rise, no crepitus, clear to      auscultation bilaterally, no chest wall tenderness, resonant to percussion throughout.                  Heart/CV:    Regular rhythm and normal rate, normal S1 and S2, no            murmur, no gallop, no rub, no click   Abdomen/GI:     Normal bowel sounds, no masses, no organomegaly, soft        non-tender, non-distended, no guarding, no rebound                 tenderness   G/U:     Deferred   Extremities/MSK:   No clubbing, cyanosis or edema.  No deformities.    Pulses:   Pulses palpable and equal bilaterally   Skin:   No bleeding, bruising or rash   Hem/Lymph:   No cervical or supraclavicular adenopathy.    Neurologic:   Moves all extremities with no obvious focal motor deficit.  Cranial nerves 2 - 12 grossly intact            Psychiatric:  Normal mood and affect, oriented x 3.      Results Review:     I reviewed the patient's new clinical results.     Results from last 7 days  Lab Units 06/04/17  1755   SODIUM mmol/L 134*   POTASSIUM mmol/L 4.5   CHLORIDE mmol/L 99   TOTAL CO2 mmol/L 24.0*   BUN mg/dL 9   CREATININE mg/dL 0.70   CALCIUM mg/dL 9.0   BILIRUBIN mg/dL 1.1   ALK PHOS U/L 85   ALT (SGPT) U/L 88*   AST (SGOT) U/L 50*   GLUCOSE mg/dL 107*       Results from last 7 days  Lab Units 06/04/17  1755   WBC 10*3/mm3 15.55*   HEMOGLOBIN g/dL 15.3   HEMATOCRIT % 44.9   PLATELETS 10*3/mm3 225               I reviewed the patient's new imaging including images and reports.    CT of the Chest  Impression:     Negative for pulmonary embolism     Bilateral groundglass opacities, likely interstitial pneumonia with borderline mediastinal lymph nodes.      She also had multiple bilateral nodules, some of which were calcified and felt to represent granulomas.      Medication Review:          Assessment/Plan     Hospital Problem List     * (Principal)Pneumonia    Asthma    Tobacco abuse    Opioid abuse    Sepsis        27-year-old female with fevers, productive cough, dyspnea, bilateral upper lobe predominant groundglass opacities and borderline lymph nodes felt to represent pneumonia.    Drug screen has come back positive for Suboxone, opiates and oxycodone.  She is an active smoker.    The patient also has multiple lung nodules, most likely representing granulomas, though cannot completely exclude septic emboli.    Plan:  - ICU  - BD  - sputum Cx, BC x2 drawn in  BHR  - HCG, UDS  - Rocephin/Azithromycin  - GI & DVT PPX  - NSAIDS for pleuritic pain  - patient denies illicit drug use, however UDS + for suboxone, and oxycodone  - ECHO in am  - Smoking cessation counseling  - Likely will need social work consult given suspected drug abuse.      Raoul Garrison MD  06/05/17  3:40 AM    40 minutes of critical care provided. This time excludes other billable procedures. Time does include preparation of documents, medical consultations, review of old records, and direct bedside care. Patient was at high risk for life-threatening deterioration due to sepsis.

## 2017-06-05 NOTE — ED NOTES
EMS called for non-emergent transfer to [facility].     Nelli Escalera  06/04/17 0674

## 2017-06-06 ENCOUNTER — APPOINTMENT (OUTPATIENT)
Dept: GENERAL RADIOLOGY | Facility: HOSPITAL | Age: 27
End: 2017-06-06

## 2017-06-06 ENCOUNTER — APPOINTMENT (OUTPATIENT)
Dept: PULMONOLOGY | Facility: HOSPITAL | Age: 27
End: 2017-06-06
Attending: INTERNAL MEDICINE

## 2017-06-06 ENCOUNTER — APPOINTMENT (OUTPATIENT)
Dept: PULMONOLOGY | Facility: HOSPITAL | Age: 27
End: 2017-06-06

## 2017-06-06 LAB
ANION GAP SERPL CALCULATED.3IONS-SCNC: 4 MMOL/L (ref 3–11)
BASOPHILS # BLD AUTO: 0 10*3/MM3 (ref 0–0.2)
BASOPHILS NFR BLD AUTO: 0 % (ref 0–1)
BUN BLD-MCNC: 14 MG/DL (ref 9–23)
BUN/CREAT SERPL: 23.3 (ref 7–25)
CALCIUM SPEC-SCNC: 8.8 MG/DL (ref 8.7–10.4)
CHLORIDE SERPL-SCNC: 109 MMOL/L (ref 99–109)
CO2 SERPL-SCNC: 27 MMOL/L (ref 20–31)
CREAT BLD-MCNC: 0.6 MG/DL (ref 0.6–1.3)
DEPRECATED RDW RBC AUTO: 46.4 FL (ref 37–54)
EOSINOPHIL # BLD AUTO: 0 10*3/MM3 (ref 0.1–0.3)
EOSINOPHIL NFR BLD AUTO: 0 % (ref 0–3)
ERYTHROCYTE [DISTWIDTH] IN BLOOD BY AUTOMATED COUNT: 13.1 % (ref 11.3–14.5)
GFR SERPL CREATININE-BSD FRML MDRD: 121 ML/MIN/1.73
GLUCOSE BLD-MCNC: 138 MG/DL (ref 70–100)
HCT VFR BLD AUTO: 40.6 % (ref 34.5–44)
HGB BLD-MCNC: 13.2 G/DL (ref 11.5–15.5)
IMM GRANULOCYTES # BLD: 0.08 10*3/MM3 (ref 0–0.03)
IMM GRANULOCYTES NFR BLD: 0.4 % (ref 0–0.6)
LYMPHOCYTES # BLD AUTO: 1.16 10*3/MM3 (ref 0.6–4.8)
LYMPHOCYTES NFR BLD AUTO: 6.2 % (ref 24–44)
MAGNESIUM SERPL-MCNC: 1.9 MG/DL (ref 1.3–2.7)
MCH RBC QN AUTO: 31.4 PG (ref 27–31)
MCHC RBC AUTO-ENTMCNC: 32.5 G/DL (ref 32–36)
MCV RBC AUTO: 96.7 FL (ref 80–99)
MONOCYTES # BLD AUTO: 0.29 10*3/MM3 (ref 0–1)
MONOCYTES NFR BLD AUTO: 1.5 % (ref 0–12)
NEUTROPHILS # BLD AUTO: 17.2 10*3/MM3 (ref 1.5–8.3)
NEUTROPHILS NFR BLD AUTO: 91.9 % (ref 41–71)
PHOSPHATE SERPL-MCNC: 3 MG/DL (ref 2.4–5.1)
PLATELET # BLD AUTO: 234 10*3/MM3 (ref 150–450)
PMV BLD AUTO: 12.2 FL (ref 6–12)
POTASSIUM BLD-SCNC: 4 MMOL/L (ref 3.5–5.5)
RBC # BLD AUTO: 4.2 10*6/MM3 (ref 3.89–5.14)
SODIUM BLD-SCNC: 140 MMOL/L (ref 132–146)
WBC NRBC COR # BLD: 18.73 10*3/MM3 (ref 3.5–10.8)

## 2017-06-06 PROCEDURE — 25010000003 CEFTRIAXONE PER 250 MG: Performed by: NURSE PRACTITIONER

## 2017-06-06 PROCEDURE — 25010000002 METHYLPREDNISOLONE PER 125 MG: Performed by: INTERNAL MEDICINE

## 2017-06-06 PROCEDURE — 94010 BREATHING CAPACITY TEST: CPT | Performed by: INTERNAL MEDICINE

## 2017-06-06 PROCEDURE — 71020 HC CHEST PA AND LATERAL: CPT

## 2017-06-06 PROCEDURE — 94010 BREATHING CAPACITY TEST: CPT

## 2017-06-06 PROCEDURE — 83735 ASSAY OF MAGNESIUM: CPT | Performed by: INTERNAL MEDICINE

## 2017-06-06 PROCEDURE — 25010000002 AZITHROMYCIN: Performed by: NURSE PRACTITIONER

## 2017-06-06 PROCEDURE — 85025 COMPLETE CBC W/AUTO DIFF WBC: CPT | Performed by: INTERNAL MEDICINE

## 2017-06-06 PROCEDURE — 94640 AIRWAY INHALATION TREATMENT: CPT

## 2017-06-06 PROCEDURE — 84100 ASSAY OF PHOSPHORUS: CPT | Performed by: INTERNAL MEDICINE

## 2017-06-06 PROCEDURE — 94760 N-INVAS EAR/PLS OXIMETRY 1: CPT

## 2017-06-06 PROCEDURE — 99232 SBSQ HOSP IP/OBS MODERATE 35: CPT | Performed by: INTERNAL MEDICINE

## 2017-06-06 PROCEDURE — 94799 UNLISTED PULMONARY SVC/PX: CPT

## 2017-06-06 PROCEDURE — 80048 BASIC METABOLIC PNL TOTAL CA: CPT | Performed by: INTERNAL MEDICINE

## 2017-06-06 PROCEDURE — 25010000002 ENOXAPARIN PER 10 MG: Performed by: NURSE PRACTITIONER

## 2017-06-06 RX ORDER — NICOTINE 21 MG/24HR
1 PATCH, TRANSDERMAL 24 HOURS TRANSDERMAL EVERY 24 HOURS
Status: DISCONTINUED | OUTPATIENT
Start: 2017-06-07 | End: 2017-06-07 | Stop reason: HOSPADM

## 2017-06-06 RX ORDER — PREDNISONE 20 MG/1
40 TABLET ORAL
Status: DISCONTINUED | OUTPATIENT
Start: 2017-06-07 | End: 2017-06-07 | Stop reason: HOSPADM

## 2017-06-06 RX ORDER — AZITHROMYCIN 250 MG/1
250 TABLET, FILM COATED ORAL
Status: DISCONTINUED | OUTPATIENT
Start: 2017-06-07 | End: 2017-06-07 | Stop reason: HOSPADM

## 2017-06-06 RX ADMIN — IPRATROPIUM BROMIDE AND ALBUTEROL SULFATE 3 ML: .5; 3 SOLUTION RESPIRATORY (INHALATION) at 16:55

## 2017-06-06 RX ADMIN — BUDESONIDE AND FORMOTEROL FUMARATE DIHYDRATE 2 PUFF: 160; 4.5 AEROSOL RESPIRATORY (INHALATION) at 08:11

## 2017-06-06 RX ADMIN — HYDROCODONE BITARTRATE AND ACETAMINOPHEN 1 TABLET: 5; 325 TABLET ORAL at 07:20

## 2017-06-06 RX ADMIN — HYDROCODONE BITARTRATE AND ACETAMINOPHEN 1 TABLET: 5; 325 TABLET ORAL at 20:38

## 2017-06-06 RX ADMIN — IPRATROPIUM BROMIDE AND ALBUTEROL SULFATE 3 ML: .5; 3 SOLUTION RESPIRATORY (INHALATION) at 19:27

## 2017-06-06 RX ADMIN — BUDESONIDE AND FORMOTEROL FUMARATE DIHYDRATE 2 PUFF: 160; 4.5 AEROSOL RESPIRATORY (INHALATION) at 19:27

## 2017-06-06 RX ADMIN — METHYLPREDNISOLONE SODIUM SUCCINATE 62.5 MG: 125 INJECTION, POWDER, FOR SOLUTION INTRAMUSCULAR; INTRAVENOUS at 00:02

## 2017-06-06 RX ADMIN — NICOTINE 1 PATCH: 14 PATCH TRANSDERMAL at 02:00

## 2017-06-06 RX ADMIN — NICOTINE 1 PATCH: 14 PATCH TRANSDERMAL at 21:10

## 2017-06-06 RX ADMIN — CEFTRIAXONE SODIUM 1 G: 1 INJECTION, SOLUTION INTRAVENOUS at 21:09

## 2017-06-06 RX ADMIN — PANTOPRAZOLE SODIUM 40 MG: 40 TABLET, DELAYED RELEASE ORAL at 06:04

## 2017-06-06 RX ADMIN — METHYLPREDNISOLONE SODIUM SUCCINATE 62.5 MG: 125 INJECTION, POWDER, FOR SOLUTION INTRAMUSCULAR; INTRAVENOUS at 12:02

## 2017-06-06 RX ADMIN — AZITHROMYCIN 500 MG: 500 INJECTION, POWDER, LYOPHILIZED, FOR SOLUTION INTRAVENOUS at 01:59

## 2017-06-06 RX ADMIN — HYDROCODONE BITARTRATE AND ACETAMINOPHEN 1 TABLET: 5; 325 TABLET ORAL at 13:57

## 2017-06-06 RX ADMIN — IPRATROPIUM BROMIDE AND ALBUTEROL SULFATE 3 ML: .5; 3 SOLUTION RESPIRATORY (INHALATION) at 08:11

## 2017-06-06 RX ADMIN — ENOXAPARIN SODIUM 40 MG: 40 INJECTION SUBCUTANEOUS at 06:05

## 2017-06-06 NOTE — PROGRESS NOTES
Adult Nutrition  Assessment/PES    Patient Name:  Charity Luna  YOB: 1990  MRN: [id number]  Admit Date:  6/5/2017    Assessment Date:  6/6/2017        Reason for Assessment       06/06/17 1039    Reason for Assessment    Reason For Assessment/Visit multidisciplinary rounds    Time Spent (min) 20    Diagnosis --   Per notes this admission.   Patient Active Problem List   Diagnosis   • Asthma   • Pneumonia   • Tobacco abuse   • Opioid abuse   • Sepsis              Nutrition/Diet History       06/06/17 1040    Nutrition/Diet History    Reported/Observed By RN    Other Pt tolerating diet, awaiting transfer to floor bed.              Labs/Tests/Procedures/Meds       06/06/17 1041    Labs/Tests/Procedures/Meds    Labs/Tests Review Reviewed                Nutrition Prescription Ordered       06/06/17 1041    Nutrition Prescription PO    Current PO Diet Regular            Evaluation of Received Nutrient/Fluid Intake       06/06/17 1041    PO Evaluation    Number of Days PO Intake Evaluated Insufficient Data              Problem/Interventions:                  Intervention Goal       06/06/17 1042    Intervention Goal    General Nutrition support treatment    PO Establish PO            Nutrition Intervention       06/06/17 1042    Nutrition Intervention    RD/Tech Action Follow Tx progress;Care plan reviewd              Education/Evaluation       06/06/17 1042    Monitor/Evaluation    Monitor Per protocol        Comments:      Electronically signed by:  Nena Renee  06/06/17 10:44 AM

## 2017-06-06 NOTE — PROGRESS NOTES
INTENSIVIST   PROGRESS NOTE     Hospital:  LOS: 1 day     Ms. Charity Luna, 27 y.o. female is followed for:      Pneumonia    Asthma    Tobacco abuse    As an Intensivist, we provide an integrated approach to the ICU patient and family, medical management of comorbid conditions, lead interdisciplinary rounds and coordinate the care with all other services, including those from other specialists.     Subjective   S     Interval History:  No new acute events overnight   Continues to improve.  Breathing much better.     The patient's relevant past medical, surgical and social history were reviewed and updated in Epic as appropriate.      ROS:   No fevers.  No chest pain.  No nausea, vomiting or diarrhea.    Objective   O     Vitals    Temp  Min: 97.8 °F (36.6 °C)  Max: 98.4 °F (36.9 °C)  BP  Min: 96/74  Max: 125/67  Pulse  Min: 93  Max: 128  Resp  Min: 17  Max: 20  SpO2  Min: 92 %  Max: 99 % Flow (L/min)  Min: 2  Max: 2    Physical Examination  Telemetry:  Sinus Rhythm: sinus tachycardia   Constitutional:  No acute distress.   HEENT: Normocephalic and atraumatic.   Cardiovascular: Normal rate, regular and rhythm. Normal heart sounds.  No murmurs, gallop or rub.  No peripheral edema.   Respiratory: No respiratory distress. Normal respiratory effort.  Normal breath sounds  Only Occasional Wheezes   Abdominal:  Soft. No masses. Non-tender. No distension. No HSM.   Extremities: No digital cyanosis. No clubbing.   Neurological:   Alert and Oriented to person, place, and time.   Psychiatric:  Normal affect. Normal behavior.   Lines/Drains/Airways: Peripheral       Results from last 7 days  Lab Units 06/06/17  0345 06/04/17  1755   WBC 10*3/mm3 18.73* 15.55*   HEMOGLOBIN g/dL 13.2 15.3   MCV fL 96.7 92.2   PLATELETS 10*3/mm3 234 225       Results from last 7 days  Lab Units 06/06/17  0345 06/05/17  0434 06/04/17  1755   SODIUM mmol/L 140  --  134*   POTASSIUM mmol/L 4.0  --  4.5   TOTAL CO2 mmol/L 27.0  --  24.0*    CREATININE mg/dL 0.60  --  0.70   MAGNESIUM mg/dL 1.9 2.1  --    PHOSPHORUS mg/dL 3.0 1.8*  --      Estimated Creatinine Clearance: 123.4 mL/min (by C-G formula based on Cr of 0.6).    Results from last 7 days  Lab Units 06/05/17  0349   PH, ARTERIAL pH units 7.375   PCO2, ARTERIAL mm Hg 39.6   PO2 ART mm Hg 66.1*   FIO2 % 28     Lab Results   Component Value Date    HEPAIGM Non-Reactive 06/05/2017    HEPBIGMCORE Non-Reactive 06/05/2017    HEPCAB Reactive (C) 06/05/2017     Images:  CXR 6/5/2017 Minimal increased marking at the left lung base.   CXR PA + Lat 6/6/2017 Pending     ECHO: EF 60%. Normal.    Results: Reviewed.  I reviewed the patient's new laboratory and imaging results.  I independently reviewed the patient's new images.    Medications: Reviewed.    Assessment/Plan   A / P     27 y.o.female, admitted on 6/5/2017 with Pulmonary infiltrate [R91.8]  Pneumonia [J18.9]:     1. Probable Pneumonia  1. CT: Bilateral GGO  2. Asthma with exacerbation  1. Leukocytosis, probably due to steroids.  3. Tobacco use  4. Opioid use  5. Hepatitis C Positive  6. Hypophosphatemia} Corrected  7. GERD  8. Abs: Ceftriazone + Azithromycin    Nutrition:   Diet Regular  Advance Directives: Full Code    Assessment / Plan:    1. Awaiting floor bed since yesterday.  2. PFT ([facility]) today.  3. Continue LABA/ICS  4. IV steroids until today, tomorrow start Prednisone  5. Quit smoking  6. Labs in AM: CBC, BMP, Phosp.  7. F/u outpatient at Pulmonary Office.    Plan of care and goals reviewed during interdisciplinary rounds.  I discussed the patient's findings and my recommendations with patient and nursing staff    Amado Ramsey MD, FACP, FCCP, [facility]    Intensive Care Medicine and Pulmonary Medicine

## 2017-06-06 NOTE — PLAN OF CARE
Problem: Patient Care Overview (Adult)  Goal: Plan of Care Review  Outcome: Ongoing (interventions implemented as appropriate)    06/06/17 0800   Coping/Psychosocial Response Interventions   Plan Of Care Reviewed With patient       Goal: Adult Individualization and Mutuality  Outcome: Ongoing (interventions implemented as appropriate)    Problem: Pneumonia (Adult)  Goal: Signs and Symptoms of Listed Potential Problems Will be Absent or Manageable (Pneumonia)  Outcome: Ongoing (interventions implemented as appropriate)

## 2017-06-07 VITALS
TEMPERATURE: 98.1 F | BODY MASS INDEX: 27.38 KG/M2 | WEIGHT: 145 LBS | HEIGHT: 61 IN | HEART RATE: 88 BPM | OXYGEN SATURATION: 98 % | RESPIRATION RATE: 16 BRPM | SYSTOLIC BLOOD PRESSURE: 119 MMHG | DIASTOLIC BLOOD PRESSURE: 76 MMHG

## 2017-06-07 LAB
ANION GAP SERPL CALCULATED.3IONS-SCNC: 3 MMOL/L (ref 3–11)
BASOPHILS # BLD AUTO: 0.01 10*3/MM3 (ref 0–0.2)
BASOPHILS NFR BLD AUTO: 0.1 % (ref 0–1)
BUN BLD-MCNC: 14 MG/DL (ref 9–23)
BUN/CREAT SERPL: 23.3 (ref 7–25)
CALCIUM SPEC-SCNC: 9.1 MG/DL (ref 8.7–10.4)
CHLORIDE SERPL-SCNC: 108 MMOL/L (ref 99–109)
CO2 SERPL-SCNC: 30 MMOL/L (ref 20–31)
CREAT BLD-MCNC: 0.6 MG/DL (ref 0.6–1.3)
DEPRECATED RDW RBC AUTO: 46 FL (ref 37–54)
EOSINOPHIL # BLD AUTO: 0.02 10*3/MM3 (ref 0.1–0.3)
EOSINOPHIL NFR BLD AUTO: 0.1 % (ref 0–3)
ERYTHROCYTE [DISTWIDTH] IN BLOOD BY AUTOMATED COUNT: 13.1 % (ref 11.3–14.5)
GFR SERPL CREATININE-BSD FRML MDRD: 121 ML/MIN/1.73
GLUCOSE BLD-MCNC: 125 MG/DL (ref 70–100)
HCT VFR BLD AUTO: 39.7 % (ref 34.5–44)
HCV RNA SERPL NAA+PROBE-ACNC: NORMAL IU/ML
HCV RNA SERPL NAA+PROBE-LOG IU: 5.32 LOG10 IU/ML
HGB BLD-MCNC: 12.7 G/DL (ref 11.5–15.5)
IMM GRANULOCYTES # BLD: 0.08 10*3/MM3 (ref 0–0.03)
IMM GRANULOCYTES NFR BLD: 0.5 % (ref 0–0.6)
LYMPHOCYTES # BLD AUTO: 3.29 10*3/MM3 (ref 0.6–4.8)
LYMPHOCYTES NFR BLD AUTO: 18.6 % (ref 24–44)
MCH RBC QN AUTO: 31 PG (ref 27–31)
MCHC RBC AUTO-ENTMCNC: 32 G/DL (ref 32–36)
MCV RBC AUTO: 96.8 FL (ref 80–99)
MONOCYTES # BLD AUTO: 0.84 10*3/MM3 (ref 0–1)
MONOCYTES NFR BLD AUTO: 4.8 % (ref 0–12)
NEUTROPHILS # BLD AUTO: 13.42 10*3/MM3 (ref 1.5–8.3)
NEUTROPHILS NFR BLD AUTO: 75.9 % (ref 41–71)
PHOSPHATE SERPL-MCNC: 3.8 MG/DL (ref 2.4–5.1)
PLATELET # BLD AUTO: 247 10*3/MM3 (ref 150–450)
PMV BLD AUTO: 11.9 FL (ref 6–12)
POTASSIUM BLD-SCNC: 3.7 MMOL/L (ref 3.5–5.5)
RBC # BLD AUTO: 4.1 10*6/MM3 (ref 3.89–5.14)
SODIUM BLD-SCNC: 141 MMOL/L (ref 132–146)
TEST INFORMATION: NORMAL
WBC NRBC COR # BLD: 17.66 10*3/MM3 (ref 3.5–10.8)

## 2017-06-07 PROCEDURE — 25010000002 ENOXAPARIN PER 10 MG: Performed by: NURSE PRACTITIONER

## 2017-06-07 PROCEDURE — 99239 HOSP IP/OBS DSCHRG MGMT >30: CPT | Performed by: INTERNAL MEDICINE

## 2017-06-07 PROCEDURE — 85025 COMPLETE CBC W/AUTO DIFF WBC: CPT | Performed by: INTERNAL MEDICINE

## 2017-06-07 PROCEDURE — 80048 BASIC METABOLIC PNL TOTAL CA: CPT | Performed by: INTERNAL MEDICINE

## 2017-06-07 PROCEDURE — 94640 AIRWAY INHALATION TREATMENT: CPT

## 2017-06-07 PROCEDURE — 84100 ASSAY OF PHOSPHORUS: CPT | Performed by: INTERNAL MEDICINE

## 2017-06-07 PROCEDURE — 63710000001 PREDNISONE PER 1 MG: Performed by: INTERNAL MEDICINE

## 2017-06-07 PROCEDURE — 94799 UNLISTED PULMONARY SVC/PX: CPT

## 2017-06-07 RX ORDER — CEFUROXIME AXETIL 250 MG/1
250 TABLET ORAL 2 TIMES DAILY
Qty: 14 TABLET | Refills: 0 | Status: SHIPPED | OUTPATIENT
Start: 2017-06-07 | End: 2017-09-11

## 2017-06-07 RX ORDER — BUDESONIDE AND FORMOTEROL FUMARATE DIHYDRATE 160; 4.5 UG/1; UG/1
2 AEROSOL RESPIRATORY (INHALATION)
Qty: 1 INHALER | Refills: 12 | Status: SHIPPED | OUTPATIENT
Start: 2017-06-07

## 2017-06-07 RX ORDER — PREDNISONE 20 MG/1
40 TABLET ORAL
Qty: 12 TABLET | Refills: 0 | Status: SHIPPED | OUTPATIENT
Start: 2017-06-07 | End: 2017-09-11

## 2017-06-07 RX ADMIN — PANTOPRAZOLE SODIUM 40 MG: 40 TABLET, DELAYED RELEASE ORAL at 05:39

## 2017-06-07 RX ADMIN — PREDNISONE 40 MG: 20 TABLET ORAL at 07:50

## 2017-06-07 RX ADMIN — AZITHROMYCIN 250 MG: 250 TABLET, FILM COATED ORAL at 07:55

## 2017-06-07 RX ADMIN — ALBUTEROL SULFATE 2.5 MG: 2.5 SOLUTION RESPIRATORY (INHALATION) at 05:43

## 2017-06-07 RX ADMIN — ENOXAPARIN SODIUM 40 MG: 40 INJECTION SUBCUTANEOUS at 05:39

## 2017-06-07 RX ADMIN — HYDROCODONE BITARTRATE AND ACETAMINOPHEN 1 TABLET: 5; 325 TABLET ORAL at 07:50

## 2017-06-07 RX ADMIN — IPRATROPIUM BROMIDE AND ALBUTEROL SULFATE 3 ML: .5; 3 SOLUTION RESPIRATORY (INHALATION) at 07:08

## 2017-06-07 RX ADMIN — BUDESONIDE AND FORMOTEROL FUMARATE DIHYDRATE 2 PUFF: 160; 4.5 AEROSOL RESPIRATORY (INHALATION) at 07:08

## 2017-06-07 RX ADMIN — IPRATROPIUM BROMIDE AND ALBUTEROL SULFATE 3 ML: .5; 3 SOLUTION RESPIRATORY (INHALATION) at 12:06

## 2017-06-07 NOTE — PLAN OF CARE
Problem: Patient Care Overview (Adult)  Goal: Plan of Care Review  Outcome: Ongoing (interventions implemented as appropriate)    06/06/17 1626 06/06/17 2038   Patient Care Overview   Progress improving --    Outcome Evaluation   Outcome Summary/Follow up Plan IV abx continue.  Has family concerns re: her mother and her mother's health.  Has pain controlled, rested well today --    Coping/Psychosocial Response Interventions   Plan Of Care Reviewed With --  patient       Goal: Discharge Needs Assessment  Outcome: Ongoing (interventions implemented as appropriate)

## 2017-06-07 NOTE — DISCHARGE SUMMARY
[facility] Medicine Services  DISCHARGE SUMMARY       Date of Admission: 6/5/2017  Date of Discharge:  6/7/2017  Primary Care Physician: JOY Vasquez  Consulting Physician(s)          None           Discharge Diagnoses:  Active Hospital Problems (** Indicates Principal Problem)    Diagnosis Date Noted   • **Pneumonia [J18.9] 06/05/2017   • Tobacco abuse [Z72.0] 06/05/2017   • Opioid abuse [F11.10] 06/05/2017   • Sepsis [A41.9] 06/05/2017   • Asthma [J45.909] 06/04/2017      Resolved Hospital Problems    Diagnosis Date Noted Date Resolved   No resolved problems to display.       Presenting Problem/History of Present Illness  Pulmonary infiltrate [R91.8]  Pneumonia [J18.9]     Chief Complaint on Day of Discharge: mildly short of breath with exertion.  Better than yesterday.       Hospital Course  Patient is a 27 y.o. female presented to [facility] with dyspnea.  She has asthma, smokes cigarettes, and has been dyspneic for months despite several courses of abx.  She was transferred to the [facility] ICU and remained there for a day on CTX/AZT and solumedrol.    On hospital day 3 she is on the floor on room air, has been afebrile since admission, and is eager to go home.  Due to the appearance of her chest CT coupled with a UDS positive for suboxone, am echo was obtained but this was normal.  She steadfastly denies drug abuse.      I will DC her to complete a course of steroids and cefuroxime.  She will follow up with pulm and her PCP.  She is urged to quit smoking.     Procedures Performed         Consults:   Consults     No orders found from 5/7/2017 to 6/6/2017.          Pertinent Test Results:    Chest CT  FINDINGS:  There is normal opacification of the pulmonary artery and its branches without pulmonary embolism. The aorta opacifies normally without dissection or aneurysm. Heart size is normal. There is scattered bilateral groundglass opacities, likely an   interstitial infectious  "process. There is scattered calcified and noncalcified subcentimeter granulomas. There are borderline mediastinal lymph nodes, likely reactive.     Impression:     Negative for pulmonary embolism     Bilateral groundglass opacities, likely interstitial pneumonia with borderline mediastinal lymph nodes      Results from last 7 days  Lab Units 06/07/17  0440   WBC 10*3/mm3 17.66*   HEMOGLOBIN g/dL 12.7   HEMATOCRIT % 39.7   PLATELETS 10*3/mm3 247     Echo  Interpretation Summary      · Left ventricular systolic function is normal. Estimated EF = 60%.  · All left ventricular wall segments contract normally.  · Left ventricular diastolic function is normal.           Condition on Discharge:  good    Physical Exam on Discharge:/76  Pulse 88  Temp 98.1 °F (36.7 °C) (Oral)   Resp 16  Ht 61\" (154.9 cm)  Wt 145 lb (65.8 kg)  LMP 05/28/2017  SpO2 98%  BMI 27.4 kg/m2  Physical Exam    Gen:  WD/WN young woman sitting up on bed, on RA, comfortable, speaks full senstences without any difficulty.  Neuro: alert and oriented, clear speech, follows commands, grossly nonfocal  HEENT:  NC/AT PERRL, OP benign  Neck:  Supple, no LAD  Heart RRR no murmur, rub, or gallop  Lungs No w r r.  Nonlabored on RA.    Abd:  Soft, nontender, no rebound or guarding, pos BS  Extrem:  No c/c/e  Skin warm and dry, mult tattoos.     Discharge Disposition  Home or Self Care    Discharge Medications   Charity Luna   Home Medication Instructions ANA LUISA:[id number]    Printed on:06/07/17 8911   Medication Information                      albuterol (PROVENTIL HFA;VENTOLIN HFA) 108 (90 BASE) MCG/ACT inhaler  Inhale 2 puffs Every 4 (Four) Hours As Needed for Wheezing.             budesonide-formoterol (SYMBICORT) 160-4.5 MCG/ACT inhaler  Inhale 2 puffs 2 (Two) Times a Day.             cefuroxime (CEFTIN) 250 MG tablet  Take 1 tablet by mouth 2 (Two) Times a Day.             predniSONE (DELTASONE) 20 MG tablet  Take 2 tablets by mouth Daily " With Breakfast. For 4 days, then 1 [id number] then stop                 Discharge Diet: routine    Discharge Care Plan / Instructions:  Follow up with PCP in a week or two    Activity at Discharge:  routine.  Quit smoking    Follow-up Appointments  pcp 2 weeks.   Pulm clinic 2 months    Test Results Pending at Discharge       Geovanna Ronquillo MD 06/07/17 2:04 PM    Time: 40 mins    Please note that portions of this note may have been completed with a voice recognition program. Efforts were made to edit the dictations, but occasionally words are mistranscribed.

## 2017-06-09 LAB
BACTERIA SPEC AEROBE CULT: NORMAL
BACTERIA SPEC AEROBE CULT: NORMAL

## 2017-06-13 NOTE — PAYOR COMM NOTE
"Charity Stark (27 y.o. Female)     Date of Birth Social Security Number Address Home Phone MRN    1990  [address] [address] [phone] [id number]    Druze Marital Status          None Single       Admission Date Admission Type Admitting Provider Attending Provider Department, Room/Bed    6/5/17 Urgent Geovanna Ronquillo MD  [facility] 5B GYN, N550/1    Discharge Date Discharge Disposition Discharge Destination        6/7/2017 Home or Self Care Home            Attending Provider: (none)    Allergies:  Codeine    Isolation:  None   Infection:  None   Code Status:  Prior    Ht:  61\" (154.9 cm)   Wt:  145 lb (65.8 kg)    Admission Cmt:  None   Principal Problem:  Pneumonia [J18.9]                 Active Insurance as of 6/5/2017     Primary Coverage     Payor Plan Insurance Group Employer/Plan Group    HU[employer] MEDICAID HU[employer] [id number]     Payor Plan Address Payor Plan Phone Number Effective From Effective To    PO  [phone] 1/1/2014     [place] [address]       Subscriber Name Subscriber Birth Date Member ID       CHARITY STARK 1990 [id number]                 Emergency Contacts      (Rel.) Home Phone Work Phone Mobile Phone    Trino Stark (Father) [phone] -- --               Discharge Summary      Geovanna Ronquillo MD at 6/7/2017  2:04 PM              [facility] Medicine Services  DISCHARGE SUMMARY       Date of Admission: 6/5/2017  Date of Discharge:  6/7/2017  Primary Care Physician: JOY Vasquez  Consulting Physician(s)          None           Discharge Diagnoses:  Active Hospital Problems (** Indicates Principal Problem)    Diagnosis Date Noted   • **Pneumonia [J18.9] 06/05/2017   • Tobacco abuse [Z72.0] 06/05/2017   • Opioid abuse [F11.10] 06/05/2017   • Sepsis [A41.9] 06/05/2017   • Asthma [J45.909] 06/04/2017      Resolved Hospital Problems    Diagnosis Date Noted Date Resolved   No " resolved problems to display.       Presenting Problem/History of Present Illness  Pulmonary infiltrate [R91.8]  Pneumonia [J18.9]     Chief Complaint on Day of Discharge: mildly short of breath with exertion.  Better than yesterday.       Hospital Course  Patient is a 27 y.o. female [facility].  She has asthma, smokes cigarettes, and has been dyspneic for months despite several courses of abx.  She was transferred to the [facility] ICU and remained there for a day on CTX/AZT and solumedrol.    On hospital day 3 she is on the floor on room air, has been afebrile since admission, and is eager to go home.  Due to the appearance of her chest CT coupled with a UDS positive for suboxone, am echo was obtained but this was normal.  She steadfastly denies drug abuse.      I will DC her to complete a course of steroids and cefuroxime.  She will follow up with pulm and her PCP.  She is urged to quit smoking.     Procedures Performed         Consults:   Consults     No orders found from 5/7/2017 to 6/6/2017.          Pertinent Test Results:    Chest CT  FINDINGS:  There is normal opacification of the pulmonary artery and its branches without pulmonary embolism. The aorta opacifies normally without dissection or aneurysm. Heart size is normal. There is scattered bilateral groundglass opacities, likely an   interstitial infectious process. There is scattered calcified and noncalcified subcentimeter granulomas. There are borderline mediastinal lymph nodes, likely reactive.     Impression:     Negative for pulmonary embolism     Bilateral groundglass opacities, likely interstitial pneumonia with borderline mediastinal lymph nodes      Results from last 7 days  Lab Units 06/07/17  0440   WBC 10*3/mm3 17.66*   HEMOGLOBIN g/dL 12.7   HEMATOCRIT % 39.7   PLATELETS 10*3/mm3 247     Echo  Interpretation Summary      · Left ventricular systolic function is normal. Estimated EF = 60%.  · All left ventricular wall  "segments contract normally.  · Left ventricular diastolic function is normal.           Condition on Discharge:  good    Physical Exam on Discharge:/76  Pulse 88  Temp 98.1 °F (36.7 °C) (Oral)   Resp 16  Ht 61\" (154.9 cm)  Wt 145 lb (65.8 kg)  LMP 05/28/2017  SpO2 98%  BMI 27.4 kg/m2  Physical Exam    Gen:  WD/WN young woman sitting up on bed, on RA, comfortable, speaks full senstences without any difficulty.  Neuro: alert and oriented, clear speech, follows commands, grossly nonfocal  HEENT:  NC/AT PERRL, OP benign  Neck:  Supple, no LAD  Heart RRR no murmur, rub, or gallop  Lungs No w r r.  Nonlabored on RA.    Abd:  Soft, nontender, no rebound or guarding, pos BS  Extrem:  No c/c/e  Skin warm and dry, mult tattoos.     Discharge Disposition  Home or Self Care    Discharge Medications   Charity Luna   Home Medication Instructions ANA LUISA:[id number]    Printed on:06/07/17 2154   Medication Information                      albuterol (PROVENTIL HFA;VENTOLIN HFA) 108 (90 BASE) MCG/ACT inhaler  Inhale 2 puffs Every 4 (Four) Hours As Needed for Wheezing.             budesonide-formoterol (SYMBICORT) 160-4.5 MCG/ACT inhaler  Inhale 2 puffs 2 (Two) Times a Day.             cefuroxime (CEFTIN) 250 MG tablet  Take 1 tablet by mouth 2 (Two) Times a Day.             predniSONE (DELTASONE) 20 MG tablet  Take 2 tablets by mouth Daily With Breakfast. For 4 days, then 1 [id number] then stop                 Discharge Diet: routine    Discharge Care Plan / Instructions:  Follow up with PCP in a week or two    Activity at Discharge:  routine.  Quit smoking    Follow-up Appointments  pcp 2 weeks.   Pulm clinic 2 months    Test Results Pending at Discharge       Geovanna Ronquillo MD 06/07/17 2:04 PM    Time: 40 mins    Please note that portions of this note may have been completed with a voice recognition program. Efforts were made to edit the dictations, but occasionally words are mistranscribed.       Electronically " signed by Geovanna Ronquillo MD at 6/7/2017  2:09 PM

## 2017-06-22 ENCOUNTER — HOSPITAL ENCOUNTER (EMERGENCY)
Facility: HOSPITAL | Age: 27
Discharge: HOME | End: 2017-06-22
Attending: STUDENT IN AN ORGANIZED HEALTH CARE EDUCATION/TRAINING PROGRAM | Admitting: STUDENT IN AN ORGANIZED HEALTH CARE EDUCATION/TRAINING PROGRAM

## 2017-06-22 VITALS
RESPIRATION RATE: 18 BRPM | DIASTOLIC BLOOD PRESSURE: 64 MMHG | HEART RATE: 95 BPM | SYSTOLIC BLOOD PRESSURE: 128 MMHG | WEIGHT: 145 LBS | HEIGHT: 61 IN | TEMPERATURE: 98.3 F | BODY MASS INDEX: 27.38 KG/M2 | OXYGEN SATURATION: 96 %

## 2017-06-22 DIAGNOSIS — S61.411A HAND LACERATION, RIGHT, INITIAL ENCOUNTER: Primary | ICD-10-CM

## 2017-06-22 PROCEDURE — 99283 EMERGENCY DEPT VISIT LOW MDM: CPT

## 2017-06-22 RX ORDER — LIDOCAINE HYDROCHLORIDE 10 MG/ML
10 INJECTION, SOLUTION INFILTRATION; PERINEURAL ONCE
Status: COMPLETED | OUTPATIENT
Start: 2017-06-22 | End: 2017-06-22

## 2017-06-22 RX ADMIN — LIDOCAINE HYDROCHLORIDE 10 ML: 10 INJECTION, SOLUTION INFILTRATION; PERINEURAL at 18:10

## 2017-06-22 NOTE — ED PROVIDER NOTES
Subjective   Patient is a 27 y.o. female presenting with skin laceration.   History provided by:  Patient   used: No    Laceration   Location:  Hand  Hand laceration location:  R hand  Depth:  Cutaneous  Bleeding: venous    Time since incident:  1 hour  Laceration mechanism:  Metal edge  Pain details:     Quality:  Aching    Severity:  Moderate    Progression:  Worsening  Foreign body present:  No foreign bodies  Relieved by:  Nothing  Worsened by:  Nothing  Tetanus status:  Unknown  Associated symptoms: no fever, no focal weakness, no numbness, no rash, no redness and no swelling        Review of Systems   Constitutional: Negative for fever.   Skin: Positive for wound. Negative for rash.   Neurological: Negative for focal weakness.   All other systems reviewed and are negative.      Past Medical History:   Diagnosis Date   • Asthma    • Cholelithiasis        Allergies   Allergen Reactions   • Ceftin [Cefuroxime] Itching   • Codeine Nausea And Vomiting       Past Surgical History:   Procedure Laterality Date   • ADENOIDECTOMY     • CHOLECYSTECTOMY     • EAR TUBES     • TONSILLECTOMY         Family History   Problem Relation Age of Onset   • Diabetes Mother    • Heart failure Mother    • Asthma Mother    • COPD Mother    • Heart failure Father    • Hypertension Father    • Hyperlipidemia Father    • No Known Problems Sister    • No Known Problems Brother        Social History     Social History   • Marital status: Single     Spouse name: N/A   • Number of children: N/A   • Years of education: N/A     Social History Main Topics   • Smoking status: Current Every Day Smoker     Packs/day: 0.50     Types: Cigarettes   • Smokeless tobacco: Never Used   • Alcohol use No   • Drug use: No   • Sexual activity: Defer     Other Topics Concern   • None     Social History Narrative    Currently unemployed, used to work in a shop           Objective   Physical Exam   Constitutional: She is oriented to person,  place, and time. She appears well-developed and well-nourished.   HENT:   Head: Normocephalic.   Right Ear: External ear normal.   Left Ear: External ear normal.   Nose: Nose normal.   Mouth/Throat: Oropharynx is clear and moist.   Eyes: Conjunctivae and EOM are normal. Pupils are equal, round, and reactive to light.   Neck: Normal range of motion. Neck supple. No tracheal deviation present. No thyromegaly present.   Cardiovascular: Normal rate, regular rhythm, normal heart sounds and intact distal pulses.    Pulmonary/Chest: Effort normal and breath sounds normal.   Abdominal: Soft. Bowel sounds are normal.   Musculoskeletal: She exhibits tenderness.        Right hand: She exhibits decreased range of motion, tenderness and laceration.        Hands:  3 cm laceration to dorsal aspect of right hand, bleeding controlled at this time. No foreign bodies seen.    Neurological: She is alert and oriented to person, place, and time. She has normal reflexes.   Skin: Skin is warm and dry.   Psychiatric: She has a normal mood and affect. Her behavior is normal. Judgment and thought content normal.   Nursing note and vitals reviewed.      Laceration Repair  Date/Time: 6/22/2017 5:56 PM  Performed by: MICHELLE CHAUDHARY  Authorized by: RONNA TORRES   Consent: Verbal consent obtained.  Risks and benefits: risks, benefits and alternatives were discussed  Consent given by: patient  Patient understanding: patient states understanding of the procedure being performed  Patient consent: the patient's understanding of the procedure matches consent given  Procedure consent: procedure consent matches procedure scheduled  Relevant documents: relevant documents present and verified  Test results: test results available and properly labeled  Patient identity confirmed: verbally with patient and arm band  Body area: upper extremity  Location details: right hand  Laceration length: 3 cm  Foreign bodies: no foreign bodies  Tendon  involvement: none  Nerve involvement: none  Vascular damage: no  Anesthesia: local infiltration    Anesthesia:  Anesthesia: local infiltration  Local Anesthetic: lidocaine 1% without epinephrine   Anesthetic total: 2 mL  Sedation:  Patient sedated: no    Irrigation solution: saline  Amount of cleaning: extensive  Debridement: none  Degree of undermining: none  Skin closure: 4-0 nylon  Number of sutures: 3  Technique: simple  Approximation: close  Approximation difficulty: simple  Dressing: 4x4 sterile gauze               ED Course  ED Course   Comment By Time   27-year-old female comes in with right hand laceration is prior to arrival.  Patient states she was taken off with to a can of corn when it lacerated her right hand.  Patient stable immunization status including tetanus is up-to-date.  Denies any other injuries at this time. Adelso Valentin PA-C 06/22 1740   27-year-old female came in after sustaining a laceration to right hand.  Patient did have wound approximated with 4-0 nylon, 1% lidocaine was used to numb the wound. Sterile dressing applied. Patient advised to have sutures removed in 7-10 days. Adelso Valentin PA-C 06/22 1757                  MDM  Number of Diagnoses or Management Options  Hand laceration, right, initial encounter: new and requires workup  Risk of Complications, Morbidity, and/or Mortality  Presenting problems: moderate  Diagnostic procedures: moderate  Management options: moderate    Patient Progress  Patient progress: stable      Final diagnoses:   Hand laceration, right, initial encounter            Adelso Valentin PA-C  06/22/17 1800       Adelso Valentin PA-C  06/22/17 1800

## 2017-09-11 ENCOUNTER — OFFICE VISIT (OUTPATIENT)
Dept: PULMONOLOGY | Facility: CLINIC | Age: 27
End: 2017-09-11

## 2017-09-11 VITALS
HEIGHT: 61 IN | OXYGEN SATURATION: 96 % | TEMPERATURE: 97.8 F | DIASTOLIC BLOOD PRESSURE: 72 MMHG | RESPIRATION RATE: 16 BRPM | WEIGHT: 141.8 LBS | HEART RATE: 103 BPM | SYSTOLIC BLOOD PRESSURE: 104 MMHG | BODY MASS INDEX: 26.77 KG/M2

## 2017-09-11 DIAGNOSIS — J18.9 PNEUMONIA DUE TO INFECTIOUS ORGANISM, UNSPECIFIED LATERALITY, UNSPECIFIED PART OF LUNG: ICD-10-CM

## 2017-09-11 DIAGNOSIS — J45.901 ASTHMA WITH ACUTE EXACERBATION, UNSPECIFIED ASTHMA SEVERITY: Primary | ICD-10-CM

## 2017-09-11 DIAGNOSIS — Z72.0 TOBACCO ABUSE: ICD-10-CM

## 2017-09-11 PROCEDURE — 99214 OFFICE O/P EST MOD 30 MIN: CPT | Performed by: NURSE PRACTITIONER

## 2017-09-11 PROCEDURE — 71020 CHG CHEST X-RAY 2 VW: CPT | Performed by: NURSE PRACTITIONER

## 2017-09-11 RX ORDER — LEVOFLOXACIN 500 MG/1
500 TABLET, FILM COATED ORAL DAILY
Qty: 7 TABLET | Refills: 0 | Status: SHIPPED | OUTPATIENT
Start: 2017-09-11

## 2017-09-11 RX ORDER — ALBUTEROL SULFATE 90 UG/1
2 AEROSOL, METERED RESPIRATORY (INHALATION) EVERY 4 HOURS PRN
Qty: 18 G | Refills: 0 | Status: SHIPPED | OUTPATIENT
Start: 2017-09-11

## 2017-09-11 RX ORDER — PREDNISONE 10 MG/1
TABLET ORAL
Qty: 31 TABLET | Refills: 0 | Status: SHIPPED | OUTPATIENT
Start: 2017-09-11

## 2017-09-11 NOTE — PROGRESS NOTES
[facility] Pulmonary Follow up    CHIEF COMPLAINT    Post hospital follow-up, asthma    HISTORY OF PRESENT ILLNESS    Charity Luna is a 27 y.o.female here today for posthospitalization follow-up.  She is in the hospital in June for pneumonia and asthma exacerbation.  She completed a course of antibiotics and steroids.  She had a CT scan that showed bilateral groundglass opacities likely interstitial pneumonia with some borderline lymphadenopathy.  At this point she is completed a course of Ceftin and prednisone, as well as a seven-day course of doxycycline prior to her hospitalization.  She is on Symbicort and Proventil for her asthma.  She was seen in the hospital by Dr. Garrison in consultation.  She have evidence of significant reflux as well as ongoing tobacco abuse.    She did have a scott done in the hospital that showed evidence of restriction with an FEV1 of 1.81 61 percent, ratio 72%.    She does continue to smoke but less than prior to her hospitalization.  She felt well for several weeks and over the last week and had some worsening congestion, productive cough, worsening shortness of air and wheezing.  She does complain of some fevers and malaise.  As well as some left-sided pleuritic pain.  She says all the symptoms are similar to when she went to the hospital.        Patient Active Problem List   Diagnosis   • Asthma   • Pneumonia   • Tobacco abuse   • Opioid abuse   • Sepsis       Allergies   Allergen Reactions   • Ceftin [Cefuroxime] Itching   • Codeine Nausea And Vomiting       Current Outpatient Prescriptions:   •  albuterol (PROVENTIL HFA;VENTOLIN HFA) 108 (90 BASE) MCG/ACT inhaler, Inhale 2 puffs Every 4 (Four) Hours As Needed for Wheezing., Disp: 18 g, Rfl: 0  •  budesonide-formoterol (SYMBICORT) 160-4.5 MCG/ACT inhaler, Inhale 2 puffs 2 (Two) Times a Day., Disp: 1 inhaler, Rfl: 12  MEDICATION LIST AND ALLERGIES REVIEWED.    Social History   Substance Use Topics   • Smoking status: Current  "Every Day Smoker     Packs/day: 0.50     Types: Cigarettes   • Smokeless tobacco: Never Used   • Alcohol use No       FAMILY AND SOCIAL HISTORY REVIEWED.    Review of Systems   Constitutional: Positive for fatigue and fever. Negative for chills and unexpected weight change.   HENT: Positive for congestion. Negative for nosebleeds, postnasal drip, rhinorrhea, sinus pressure and trouble swallowing.    Respiratory: Positive for cough, shortness of breath and wheezing. Negative for chest tightness.    Cardiovascular: Positive for chest pain (left-sided pleuritic pain). Negative for leg swelling.   Gastrointestinal: Negative for abdominal pain, constipation, diarrhea, nausea and vomiting.   Genitourinary: Negative for dysuria, frequency, hematuria and urgency.   Musculoskeletal: Positive for myalgias.   Neurological: Negative for dizziness, weakness, numbness and headaches.   All other systems reviewed and are negative.  .    /72  Pulse 103  Temp 97.8 °F (36.6 °C)  Resp 16  Ht 61\" (154.9 cm)  Wt 141 lb 12.8 oz (64.3 kg)  SpO2 96% Comment: RA  BMI 26.79 kg/m2    Physical Exam   Constitutional: She is oriented to person, place, and time. She appears well-developed and well-nourished.   HENT:   Head: Normocephalic and atraumatic.   Eyes: EOM are normal. Pupils are equal, round, and reactive to light.   Neck: Normal range of motion. Neck supple.   Cardiovascular: Normal rate and regular rhythm.    No murmur heard.  Pulmonary/Chest: Effort normal. No respiratory distress. She has wheezes. She has no rales.   Wheezing bilaterally, expiratory.  Decreased bilaterally   Abdominal: Soft. Bowel sounds are normal. She exhibits no distension.   Musculoskeletal: Normal range of motion. She exhibits no edema.   Neurological: She is alert and oriented to person, place, and time.   Skin: Skin is warm and dry. No erythema.   Psychiatric: She has a normal mood and affect. Her behavior is normal.   Vitals " reviewed.        RESULTS    PA and lateral chest x-ray done in the office today, reviewed by me:  Improvement of the left lower lobe infiltrate, no acute findings      CT scan of the chest June 4, 2017  FINDINGS:  There is normal opacification of the pulmonary artery and its branches without pulmonary embolism. The aorta opacifies normally without dissection or aneurysm. Heart size is normal. There is scattered bilateral groundglass opacities, likely an   interstitial infectious process. There is scattered calcified and noncalcified subcentimeter granulomas. There are borderline mediastinal lymph nodes, likely reactive.     Impression:     Negative for pulmonary embolism     Bilateral groundglass opacities, likely interstitial pneumonia with borderline mediastinal lymph nodes     CXR 6/6/17  1.  Compared to previous studies of yesterday, there has been no change.  There is linear and mild nodular opacity out at the left lung base and  left costophrenic angle layering along the left hemidiaphragm which does  not appear to have changed. Activity is indeterminate.  2.  No other significant abnormalities are identified in the chest. The  right lung is clear.       PROBLEM LIST    Problem List Items Addressed This Visit        Respiratory    Asthma - Primary    Pneumonia       Other    Tobacco abuse            DISCUSSION    She does need a full PFTs on her next visit the office.  I will be done today since she is acutely ill. follow-up in 2 weeks, at that time we will discuss chronic asthma maintenance including the use of her Symbicort and other maintenance medications.    We went over smoking cessation, including patient aids and programs.    Continue on her maintenance of Symbicort twice a day and albuterol as needed, I'll call in a refill for her HFA as well as give her prescription for a nebulizer and some albuterol nebulizer samples to help with the acute illness currently.  She needs a course of Levaquin and  prednisone due to worsening cough with fevers and malaise.    Follow-up in 2 weeks to make sure she is well      JOY Alonso  09/11/201712:44 PM  Electronically signed     Please note that portions of this note were completed with a voice recognition program. Efforts were made to edit the dictations, but occasionally words are mistranscribed.      CC: JOY Vasquez

## 2017-09-15 ENCOUNTER — TELEPHONE (OUTPATIENT)
Dept: PULMONOLOGY | Facility: CLINIC | Age: 27
End: 2017-09-15

## 2017-09-15 NOTE — TELEPHONE ENCOUNTER
Called in [address] 2 puffs BID. Insurance not covering Symbicort. Refilled pt's albuterol HFA. Pharmacy to call pt when refills are ready.

## 2019-06-07 NOTE — PLAN OF CARE
No pushing, pulling, tugging,  heavy lifting, or strenuous activity.  No major decision making, driving, or drinking alcoholic beverages for 24 hours. ( due to the medications you have  received)  Always use good hand hygiene/washing techniques.  NO driving while taking pain medications.    To assist you in voiding:  Drink plenty of fluids  Listen to running water while attempting to void.    If you are unable to urinate and you have an uncomfortable urge to void or it has been   6 hours since you were discharged, return to the Emergency Room    *******************************************************    Postprocedure instructions.  1. Nothing by mouth for 30 min.  2. Clear liquids diet (No Sodas) for 1 hours.  3. May advance to soft low-fat diet  in 2 hours       Diet otherwise:    1. Low fat diet.    2. Avoid soda beverages, excessive use of coffee and tea.   3. Avoid smoking and alcohol.      Other Instructions:  Call [facility] at [phone] or come to the Emergency Department if you experience the following: Chest pain, abdominal pain, bleeding (vomiting of blood or coffee colored material, black stools or randee blood in stools),   fever/chills, nausea and vomiting or dizziness.    Follow-up:    Dr. Bee in 3-4 weeks.   Office phone #[phone].   If you already have an appointment just keep that appointment.    ************************************************************************************    Notes to the patient and the family from Dr. Bee.    Dear patient/family member,    Findings on today's procedure are as follows:  1. Esophagitis. Inflammation of the esophagus.  2. Inflammation of the stomach.  Erosive gastritis.    3. Sliding hiatal hernia.    4. Stomach polyps.  6 were removed.  5. No cancer. No active ulcers.    Recommendations:    1. Protonix (Pantoprazole) tablet 40 mg tablet. Take 1 tablet orally in the morning half an hour before eating every day.  First dose to be  Problem: Patient Care Overview (Adult)  Goal: Plan of Care Review  Outcome: Ongoing (interventions implemented as appropriate)    06/06/17 1000 06/06/17 1626   Patient Care Overview   Progress --  improving   Outcome Evaluation   Outcome Summary/Follow up Plan --  pain controlled, rested well today   Coping/Psychosocial Response Interventions   Plan Of Care Reviewed With patient --        Goal: Adult Individualization and Mutuality  Outcome: Ongoing (interventions implemented as appropriate)  Goal: Discharge Needs Assessment  Outcome: Ongoing (interventions implemented as appropriate)    Problem: Pneumonia (Adult)  Goal: Signs and Symptoms of Listed Potential Problems Will be Absent or Manageable (Pneumonia)  Outcome: Ongoing (interventions implemented as appropriate)       taken today.  2. Other instructions as above.      Should you have more questions please do not hesitate to talk to the nurse who can call me and let me talk to you.      I hope you feel better.    Chevy Bee M.D., FACP, FACG.

## 2019-11-26 ENCOUNTER — HOSPITAL ENCOUNTER (OUTPATIENT)
Dept: GENERAL RADIOLOGY | Facility: HOSPITAL | Age: 29
Discharge: HOME | End: 2019-11-26
Admitting: FAMILY MEDICINE

## 2019-11-26 DIAGNOSIS — M79.671 BILATERAL FOOT PAIN: ICD-10-CM

## 2019-11-26 DIAGNOSIS — M79.672 BILATERAL FOOT PAIN: ICD-10-CM

## 2019-11-26 PROCEDURE — 73630 X-RAY EXAM OF FOOT: CPT | Performed by: RADIOLOGY

## 2019-11-26 PROCEDURE — 73630 X-RAY EXAM OF FOOT: CPT

## 2022-02-23 ENCOUNTER — APPOINTMENT (OUTPATIENT)
Dept: MAMMOGRAPHY | Facility: HOSPITAL | Age: 32
End: 2022-02-23

## 2022-02-23 ENCOUNTER — APPOINTMENT (OUTPATIENT)
Dept: ULTRASOUND IMAGING | Facility: HOSPITAL | Age: 32
End: 2022-02-23

## 2022-03-03 ENCOUNTER — APPOINTMENT (OUTPATIENT)
Dept: MAMMOGRAPHY | Facility: HOSPITAL | Age: 32
End: 2022-03-03

## 2022-03-03 ENCOUNTER — APPOINTMENT (OUTPATIENT)
Dept: ULTRASOUND IMAGING | Facility: HOSPITAL | Age: 32
End: 2022-03-03

## 2023-04-03 ENCOUNTER — APPOINTMENT (OUTPATIENT)
Dept: GENERAL RADIOLOGY | Facility: HOSPITAL | Age: 33
DRG: 190 | End: 2023-04-03
Payer: MEDICAID

## 2023-04-03 ENCOUNTER — HOSPITAL ENCOUNTER (INPATIENT)
Facility: HOSPITAL | Age: 33
LOS: 1 days | Discharge: LEFT BEFORE DISCHARGE | DRG: 190 | End: 2023-04-04
Attending: FAMILY MEDICINE | Admitting: INTERNAL MEDICINE
Payer: MEDICAID

## 2023-04-03 DIAGNOSIS — J44.1 COPD WITH ACUTE EXACERBATION (HCC): ICD-10-CM

## 2023-04-03 DIAGNOSIS — R09.02 HYPOXIA: Primary | ICD-10-CM

## 2023-04-03 DIAGNOSIS — R06.03 ACUTE RESPIRATORY DISTRESS: ICD-10-CM

## 2023-04-03 LAB
ALBUMIN SERPL-MCNC: 4.3 G/DL (ref 3.4–4.8)
ALBUMIN/GLOB SERPL: 1.6 {RATIO} (ref 0.8–2)
ALP SERPL-CCNC: 111 U/L (ref 25–100)
ALT SERPL-CCNC: 56 U/L (ref 4–36)
ANION GAP SERPL CALCULATED.3IONS-SCNC: 8 MMOL/L (ref 3–16)
AST SERPL-CCNC: 27 U/L (ref 8–33)
BASE EXCESS BLDA CALC-SCNC: 1.1 MMOL/L (ref -3–3)
BASOPHILS # BLD: 0.1 K/UL (ref 0–0.1)
BASOPHILS NFR BLD: 0.5 %
BILIRUB SERPL-MCNC: 0.5 MG/DL (ref 0.3–1.2)
BUN SERPL-MCNC: 8 MG/DL (ref 6–20)
CALCIUM SERPL-MCNC: 8.9 MG/DL (ref 8.5–10.5)
CHLORIDE SERPL-SCNC: 102 MMOL/L (ref 98–107)
CO2 BLDA-SCNC: 29.7 MMOL/L (ref 24–30)
CO2 SERPL-SCNC: 28 MMOL/L (ref 20–30)
CREAT SERPL-MCNC: 0.6 MG/DL (ref 0.4–1.2)
EOSINOPHIL # BLD: 0.6 K/UL (ref 0–0.4)
EOSINOPHIL NFR BLD: 5.5 %
ERYTHROCYTE [DISTWIDTH] IN BLOOD BY AUTOMATED COUNT: 12.2 % (ref 11–16)
FLUAV AG NPH QL: NEGATIVE
FLUBV AG NPH QL: NEGATIVE
GFR SERPLBLD CREATININE-BSD FMLA CKD-EPI: >60 ML/MIN/{1.73_M2}
GLOBULIN SER CALC-MCNC: 2.7 G/DL
GLUCOSE SERPL-MCNC: 99 MG/DL (ref 74–106)
HCO3 BLDA-SCNC: 28 MMOL/L (ref 22–26)
HCT VFR BLD AUTO: 46.3 % (ref 37–47)
HGB BLD-MCNC: 15.3 G/DL (ref 11.5–16.5)
IMM GRANULOCYTES # BLD: 0 K/UL
IMM GRANULOCYTES NFR BLD: 0.3 % (ref 0–5)
INHALED O2 FLOW RATE: 0.36 %
LACTATE BLDV-SCNC: 1.9 MMOL/L (ref 0.4–2)
LYMPHOCYTES # BLD: 1.6 K/UL (ref 1.5–4)
LYMPHOCYTES NFR BLD: 13.9 %
MCH RBC QN AUTO: 31.9 PG (ref 27–32)
MCHC RBC AUTO-ENTMCNC: 33 G/DL (ref 31–35)
MCV RBC AUTO: 96.5 FL (ref 80–100)
MONOCYTES # BLD: 0.7 K/UL (ref 0.2–0.8)
MONOCYTES NFR BLD: 5.6 %
NEUTROPHILS # BLD: 8.7 K/UL (ref 2–7.5)
NEUTS SEG NFR BLD: 74.2 %
O2 THERAPY: ABNORMAL
PCO2 BLDA: 54.2 MMHG (ref 35–45)
PH BLDA: 7.33 [PH] (ref 7.35–7.45)
PLATELET # BLD AUTO: 274 K/UL (ref 150–400)
PMV BLD AUTO: 10.9 FL (ref 6–10)
PO2 BLDA: 66.8 MMHG (ref 80–100)
POTASSIUM SERPL-SCNC: 4.1 MMOL/L (ref 3.4–5.1)
PROT SERPL-MCNC: 7 G/DL (ref 6.4–8.3)
RBC # BLD AUTO: 4.8 M/UL (ref 3.8–5.8)
SAO2 % BLDA: 92.5 %
SARS-COV-2 RDRP RESP QL NAA+PROBE: NOT DETECTED
SODIUM SERPL-SCNC: 138 MMOL/L (ref 136–145)
WBC # BLD AUTO: 11.7 K/UL (ref 4–11)

## 2023-04-03 PROCEDURE — 82803 BLOOD GASES ANY COMBINATION: CPT

## 2023-04-03 PROCEDURE — 96375 TX/PRO/DX INJ NEW DRUG ADDON: CPT

## 2023-04-03 PROCEDURE — 96366 THER/PROPH/DIAG IV INF ADDON: CPT

## 2023-04-03 PROCEDURE — 85025 COMPLETE CBC W/AUTO DIFF WBC: CPT

## 2023-04-03 PROCEDURE — 96365 THER/PROPH/DIAG IV INF INIT: CPT

## 2023-04-03 PROCEDURE — 71045 X-RAY EXAM CHEST 1 VIEW: CPT

## 2023-04-03 PROCEDURE — 80053 COMPREHEN METABOLIC PANEL: CPT

## 2023-04-03 PROCEDURE — 87804 INFLUENZA ASSAY W/OPTIC: CPT

## 2023-04-03 PROCEDURE — 6360000002 HC RX W HCPCS: Performed by: FAMILY MEDICINE

## 2023-04-03 PROCEDURE — 6370000000 HC RX 637 (ALT 250 FOR IP): Performed by: FAMILY MEDICINE

## 2023-04-03 PROCEDURE — 87040 BLOOD CULTURE FOR BACTERIA: CPT

## 2023-04-03 PROCEDURE — 83605 ASSAY OF LACTIC ACID: CPT

## 2023-04-03 PROCEDURE — 87635 SARS-COV-2 COVID-19 AMP PRB: CPT

## 2023-04-03 PROCEDURE — 94640 AIRWAY INHALATION TREATMENT: CPT

## 2023-04-03 PROCEDURE — 36600 WITHDRAWAL OF ARTERIAL BLOOD: CPT

## 2023-04-03 PROCEDURE — 93005 ELECTROCARDIOGRAM TRACING: CPT

## 2023-04-03 PROCEDURE — 36415 COLL VENOUS BLD VENIPUNCTURE: CPT

## 2023-04-03 PROCEDURE — 99285 EMERGENCY DEPT VISIT HI MDM: CPT

## 2023-04-03 RX ORDER — IPRATROPIUM BROMIDE AND ALBUTEROL SULFATE 2.5; .5 MG/3ML; MG/3ML
1 SOLUTION RESPIRATORY (INHALATION)
Status: COMPLETED | OUTPATIENT
Start: 2023-04-03 | End: 2023-04-03

## 2023-04-03 RX ORDER — ACETAMINOPHEN 500 MG
1000 TABLET ORAL ONCE
Status: COMPLETED | OUTPATIENT
Start: 2023-04-03 | End: 2023-04-03

## 2023-04-03 RX ORDER — KETOROLAC TROMETHAMINE 30 MG/ML
30 INJECTION, SOLUTION INTRAMUSCULAR; INTRAVENOUS ONCE
Status: COMPLETED | OUTPATIENT
Start: 2023-04-03 | End: 2023-04-03

## 2023-04-03 RX ORDER — MAGNESIUM SULFATE IN WATER 40 MG/ML
2000 INJECTION, SOLUTION INTRAVENOUS ONCE
Status: COMPLETED | OUTPATIENT
Start: 2023-04-03 | End: 2023-04-04

## 2023-04-03 RX ORDER — ALBUTEROL SULFATE 1.25 MG/3ML
1 SOLUTION RESPIRATORY (INHALATION) EVERY 4 HOURS PRN
Status: ON HOLD | COMMUNITY
End: 2023-04-04 | Stop reason: SDUPTHER

## 2023-04-03 RX ORDER — BUDESONIDE AND FORMOTEROL FUMARATE DIHYDRATE 160; 4.5 UG/1; UG/1
2 AEROSOL RESPIRATORY (INHALATION) 2 TIMES DAILY
Status: ON HOLD | COMMUNITY
End: 2023-04-04 | Stop reason: SDUPTHER

## 2023-04-03 RX ORDER — METHYLPREDNISOLONE SODIUM SUCCINATE 125 MG/2ML
125 INJECTION, POWDER, LYOPHILIZED, FOR SOLUTION INTRAMUSCULAR; INTRAVENOUS ONCE
Status: COMPLETED | OUTPATIENT
Start: 2023-04-03 | End: 2023-04-03

## 2023-04-03 RX ORDER — HYDROCODONE POLISTIREX AND CHLORPHENIRAMINE POLISTIREX 10; 8 MG/5ML; MG/5ML
5 SUSPENSION, EXTENDED RELEASE ORAL ONCE
Status: COMPLETED | OUTPATIENT
Start: 2023-04-03 | End: 2023-04-03

## 2023-04-03 RX ADMIN — MAGNESIUM SULFATE HEPTAHYDRATE 2000 MG: 40 INJECTION, SOLUTION INTRAVENOUS at 22:11

## 2023-04-03 RX ADMIN — KETOROLAC TROMETHAMINE 30 MG: 30 INJECTION, SOLUTION INTRAMUSCULAR; INTRAVENOUS at 22:09

## 2023-04-03 RX ADMIN — Medication 5 ML: at 22:13

## 2023-04-03 RX ADMIN — METHYLPREDNISOLONE SODIUM SUCCINATE 125 MG: 125 INJECTION INTRAMUSCULAR; INTRAVENOUS at 21:32

## 2023-04-03 RX ADMIN — ACETAMINOPHEN 1000 MG: 500 TABLET ORAL at 21:32

## 2023-04-03 RX ADMIN — IPRATROPIUM BROMIDE AND ALBUTEROL SULFATE 1 AMPULE: .5; 3 SOLUTION RESPIRATORY (INHALATION) at 20:30

## 2023-04-03 ASSESSMENT — PAIN DESCRIPTION - PAIN TYPE: TYPE: ACUTE PAIN

## 2023-04-03 ASSESSMENT — ENCOUNTER SYMPTOMS
COUGH: 1
SHORTNESS OF BREATH: 1
WHEEZING: 1
CHEST TIGHTNESS: 1

## 2023-04-03 ASSESSMENT — LIFESTYLE VARIABLES
HOW MANY STANDARD DRINKS CONTAINING ALCOHOL DO YOU HAVE ON A TYPICAL DAY: PATIENT DOES NOT DRINK
HOW OFTEN DO YOU HAVE A DRINK CONTAINING ALCOHOL: NEVER

## 2023-04-03 ASSESSMENT — PAIN DESCRIPTION - DESCRIPTORS: DESCRIPTORS: ACHING

## 2023-04-03 ASSESSMENT — PAIN SCALES - GENERAL
PAINLEVEL_OUTOF10: 8
PAINLEVEL_OUTOF10: 8
PAINLEVEL_OUTOF10: 7

## 2023-04-03 ASSESSMENT — PAIN DESCRIPTION - LOCATION: LOCATION: HEAD

## 2023-04-03 ASSESSMENT — PAIN - FUNCTIONAL ASSESSMENT: PAIN_FUNCTIONAL_ASSESSMENT: 0-10

## 2023-04-03 ASSESSMENT — PAIN DESCRIPTION - FREQUENCY: FREQUENCY: CONTINUOUS

## 2023-04-04 VITALS
HEIGHT: 60 IN | RESPIRATION RATE: 20 BRPM | WEIGHT: 145 LBS | DIASTOLIC BLOOD PRESSURE: 69 MMHG | TEMPERATURE: 98.8 F | OXYGEN SATURATION: 96 % | BODY MASS INDEX: 28.47 KG/M2 | SYSTOLIC BLOOD PRESSURE: 109 MMHG | HEART RATE: 108 BPM

## 2023-04-04 PROBLEM — J96.01 ACUTE RESPIRATORY FAILURE WITH HYPOXIA AND HYPERCAPNIA (HCC): Status: ACTIVE | Noted: 2023-04-04

## 2023-04-04 PROBLEM — Z53.29 LEFT AGAINST MEDICAL ADVICE: Status: ACTIVE | Noted: 2023-04-04

## 2023-04-04 PROBLEM — F19.11 HISTORY OF INTRAVENOUS DRUG ABUSE (HCC): Status: ACTIVE | Noted: 2023-04-04

## 2023-04-04 PROBLEM — R82.5 POSITIVE URINE DRUG SCREEN: Status: ACTIVE | Noted: 2023-04-04

## 2023-04-04 PROBLEM — J96.02 ACUTE RESPIRATORY FAILURE WITH HYPOXIA AND HYPERCAPNIA (HCC): Status: ACTIVE | Noted: 2023-04-04

## 2023-04-04 PROBLEM — J44.1 COPD WITH ACUTE EXACERBATION (HCC): Status: ACTIVE | Noted: 2023-04-04

## 2023-04-04 PROBLEM — R74.01 TRANSAMINITIS: Status: ACTIVE | Noted: 2023-04-04

## 2023-04-04 PROBLEM — J18.9 PNEUMONIA: Status: ACTIVE | Noted: 2023-04-04

## 2023-04-04 LAB
ALBUMIN SERPL-MCNC: 4.1 G/DL (ref 3.4–4.8)
ALBUMIN/GLOB SERPL: 1.6 {RATIO} (ref 0.8–2)
ALP SERPL-CCNC: 104 U/L (ref 25–100)
ALT SERPL-CCNC: 52 U/L (ref 4–36)
AMPHET UR QL SCN: POSITIVE
ANION GAP SERPL CALCULATED.3IONS-SCNC: 11 MMOL/L (ref 3–16)
AST SERPL-CCNC: 23 U/L (ref 8–33)
BARBITURATES UR QL SCN: ABNORMAL
BENZODIAZ UR QL SCN: ABNORMAL
BILIRUB SERPL-MCNC: 0.5 MG/DL (ref 0.3–1.2)
BUN SERPL-MCNC: 7 MG/DL (ref 6–20)
BUPRENORPHINE QUAL, URINE: ABNORMAL
CALCIUM SERPL-MCNC: 9 MG/DL (ref 8.5–10.5)
CANNABINOIDS UR QL SCN: ABNORMAL
CHLORIDE SERPL-SCNC: 99 MMOL/L (ref 98–107)
CO2 SERPL-SCNC: 27 MMOL/L (ref 20–30)
COCAINE UR QL SCN: ABNORMAL
CREAT SERPL-MCNC: 0.7 MG/DL (ref 0.4–1.2)
DRUG SCREEN COMMENT UR-IMP: ABNORMAL
ERYTHROCYTE [DISTWIDTH] IN BLOOD BY AUTOMATED COUNT: 12 % (ref 11–16)
GFR SERPLBLD CREATININE-BSD FMLA CKD-EPI: >60 ML/MIN/{1.73_M2}
GLOBULIN SER CALC-MCNC: 2.6 G/DL
GLUCOSE SERPL-MCNC: 177 MG/DL (ref 74–106)
HCT VFR BLD AUTO: 43.3 % (ref 37–47)
HGB BLD-MCNC: 14.1 G/DL (ref 11.5–16.5)
MCH RBC QN AUTO: 31.5 PG (ref 27–32)
MCHC RBC AUTO-ENTMCNC: 32.6 G/DL (ref 31–35)
MCV RBC AUTO: 96.9 FL (ref 80–100)
METHADONE UR QL SCN: ABNORMAL
METHAMPHET UR QL SCN: POSITIVE
OPIATES UR QL SCN: POSITIVE
OXYCODONE UR QL SCN: ABNORMAL
PCP UR QL SCN: ABNORMAL
PLATELET # BLD AUTO: 257 K/UL (ref 150–400)
PMV BLD AUTO: 11.2 FL (ref 6–10)
POTASSIUM SERPL-SCNC: 4.3 MMOL/L (ref 3.4–5.1)
PROPOXYPH UR QL SCN: ABNORMAL
PROT SERPL-MCNC: 6.7 G/DL (ref 6.4–8.3)
RBC # BLD AUTO: 4.47 M/UL (ref 3.8–5.8)
SODIUM SERPL-SCNC: 137 MMOL/L (ref 136–145)
TRICYCLICS UR QL SCN: ABNORMAL
WBC # BLD AUTO: 8.3 K/UL (ref 4–11)

## 2023-04-04 PROCEDURE — 6360000002 HC RX W HCPCS: Performed by: FAMILY MEDICINE

## 2023-04-04 PROCEDURE — 6370000000 HC RX 637 (ALT 250 FOR IP): Performed by: PHYSICIAN ASSISTANT

## 2023-04-04 PROCEDURE — 6360000002 HC RX W HCPCS: Performed by: PHYSICIAN ASSISTANT

## 2023-04-04 PROCEDURE — 80074 ACUTE HEPATITIS PANEL: CPT

## 2023-04-04 PROCEDURE — 36415 COLL VENOUS BLD VENIPUNCTURE: CPT

## 2023-04-04 PROCEDURE — 97161 PT EVAL LOW COMPLEX 20 MIN: CPT

## 2023-04-04 PROCEDURE — 97165 OT EVAL LOW COMPLEX 30 MIN: CPT

## 2023-04-04 PROCEDURE — 94640 AIRWAY INHALATION TREATMENT: CPT

## 2023-04-04 PROCEDURE — 80053 COMPREHEN METABOLIC PANEL: CPT

## 2023-04-04 PROCEDURE — 2700000000 HC OXYGEN THERAPY PER DAY

## 2023-04-04 PROCEDURE — 80307 DRUG TEST PRSMV CHEM ANLYZR: CPT

## 2023-04-04 PROCEDURE — 1200000000 HC SEMI PRIVATE

## 2023-04-04 PROCEDURE — 85027 COMPLETE CBC AUTOMATED: CPT

## 2023-04-04 PROCEDURE — 94761 N-INVAS EAR/PLS OXIMETRY MLT: CPT

## 2023-04-04 RX ORDER — BUDESONIDE AND FORMOTEROL FUMARATE DIHYDRATE 160; 4.5 UG/1; UG/1
2 AEROSOL RESPIRATORY (INHALATION) 2 TIMES DAILY
Qty: 10.2 G | Refills: 0 | Status: SHIPPED | OUTPATIENT
Start: 2023-04-04

## 2023-04-04 RX ORDER — ONDANSETRON 2 MG/ML
4 INJECTION INTRAMUSCULAR; INTRAVENOUS EVERY 6 HOURS PRN
Status: DISCONTINUED | OUTPATIENT
Start: 2023-04-04 | End: 2023-04-04 | Stop reason: HOSPADM

## 2023-04-04 RX ORDER — PREDNISONE 20 MG/1
TABLET ORAL
Qty: 20 TABLET | Refills: 0 | Status: SHIPPED | OUTPATIENT
Start: 2023-04-04 | End: 2023-04-13

## 2023-04-04 RX ORDER — ONDANSETRON 4 MG/1
4 TABLET, ORALLY DISINTEGRATING ORAL EVERY 8 HOURS PRN
Status: DISCONTINUED | OUTPATIENT
Start: 2023-04-04 | End: 2023-04-04 | Stop reason: HOSPADM

## 2023-04-04 RX ORDER — GUAIFENESIN 600 MG/1
600 TABLET, EXTENDED RELEASE ORAL 2 TIMES DAILY
Qty: 14 TABLET | Refills: 0 | Status: SHIPPED | OUTPATIENT
Start: 2023-04-04 | End: 2023-04-11

## 2023-04-04 RX ORDER — ACETAMINOPHEN 325 MG/1
650 TABLET ORAL EVERY 6 HOURS PRN
Status: DISCONTINUED | OUTPATIENT
Start: 2023-04-04 | End: 2023-04-04 | Stop reason: HOSPADM

## 2023-04-04 RX ORDER — IPRATROPIUM BROMIDE AND ALBUTEROL SULFATE 2.5; .5 MG/3ML; MG/3ML
1 SOLUTION RESPIRATORY (INHALATION)
Status: DISCONTINUED | OUTPATIENT
Start: 2023-04-04 | End: 2023-04-04 | Stop reason: HOSPADM

## 2023-04-04 RX ORDER — GUAIFENESIN 600 MG/1
600 TABLET, EXTENDED RELEASE ORAL 2 TIMES DAILY
Status: DISCONTINUED | OUTPATIENT
Start: 2023-04-04 | End: 2023-04-04 | Stop reason: HOSPADM

## 2023-04-04 RX ORDER — ALBUTEROL SULFATE 1.25 MG/3ML
1 SOLUTION RESPIRATORY (INHALATION) EVERY 4 HOURS PRN
Qty: 360 ML | Refills: 3 | Status: SHIPPED | OUTPATIENT
Start: 2023-04-04

## 2023-04-04 RX ORDER — METHYLPREDNISOLONE SODIUM SUCCINATE 125 MG/2ML
60 INJECTION, POWDER, LYOPHILIZED, FOR SOLUTION INTRAMUSCULAR; INTRAVENOUS EVERY 12 HOURS SCHEDULED
Status: DISCONTINUED | OUTPATIENT
Start: 2023-04-04 | End: 2023-04-04 | Stop reason: HOSPADM

## 2023-04-04 RX ORDER — LEVOFLOXACIN 750 MG/1
750 TABLET ORAL DAILY
Qty: 9 TABLET | Refills: 0 | Status: SHIPPED | OUTPATIENT
Start: 2023-04-04 | End: 2023-04-13

## 2023-04-04 RX ORDER — LEVOFLOXACIN 5 MG/ML
750 INJECTION, SOLUTION INTRAVENOUS ONCE
Status: COMPLETED | OUTPATIENT
Start: 2023-04-04 | End: 2023-04-04

## 2023-04-04 RX ORDER — ACETAMINOPHEN 650 MG/1
650 SUPPOSITORY RECTAL EVERY 6 HOURS PRN
Status: DISCONTINUED | OUTPATIENT
Start: 2023-04-04 | End: 2023-04-04 | Stop reason: HOSPADM

## 2023-04-04 RX ORDER — GUAIFENESIN/DEXTROMETHORPHAN 100-10MG/5
5 SYRUP ORAL EVERY 4 HOURS PRN
Status: DISCONTINUED | OUTPATIENT
Start: 2023-04-04 | End: 2023-04-04 | Stop reason: HOSPADM

## 2023-04-04 RX ORDER — BUDESONIDE 0.5 MG/2ML
0.5 INHALANT ORAL 2 TIMES DAILY
Status: DISCONTINUED | OUTPATIENT
Start: 2023-04-04 | End: 2023-04-04 | Stop reason: HOSPADM

## 2023-04-04 RX ORDER — LEVOFLOXACIN 5 MG/ML
750 INJECTION, SOLUTION INTRAVENOUS EVERY 24 HOURS
Status: DISCONTINUED | OUTPATIENT
Start: 2023-04-05 | End: 2023-04-04 | Stop reason: HOSPADM

## 2023-04-04 RX ORDER — POLYETHYLENE GLYCOL 3350 17 G/17G
17 POWDER, FOR SOLUTION ORAL DAILY PRN
Status: DISCONTINUED | OUTPATIENT
Start: 2023-04-04 | End: 2023-04-04 | Stop reason: HOSPADM

## 2023-04-04 RX ORDER — ENOXAPARIN SODIUM 100 MG/ML
40 INJECTION SUBCUTANEOUS DAILY
Status: DISCONTINUED | OUTPATIENT
Start: 2023-04-04 | End: 2023-04-04 | Stop reason: HOSPADM

## 2023-04-04 RX ORDER — GUAIFENESIN/DEXTROMETHORPHAN 100-10MG/5
5 SYRUP ORAL EVERY 4 HOURS PRN
Qty: 120 ML | Refills: 0 | Status: SHIPPED | OUTPATIENT
Start: 2023-04-04 | End: 2023-04-14

## 2023-04-04 RX ADMIN — IPRATROPIUM BROMIDE AND ALBUTEROL SULFATE 1 AMPULE: .5; 2.5 SOLUTION RESPIRATORY (INHALATION) at 09:18

## 2023-04-04 RX ADMIN — METHYLPREDNISOLONE SODIUM SUCCINATE 60 MG: 125 INJECTION INTRAMUSCULAR; INTRAVENOUS at 09:16

## 2023-04-04 RX ADMIN — LEVOFLOXACIN 750 MG: 5 INJECTION, SOLUTION INTRAVENOUS at 01:46

## 2023-04-04 RX ADMIN — BUDESONIDE 500 MCG: 0.5 SUSPENSION RESPIRATORY (INHALATION) at 04:25

## 2023-04-04 RX ADMIN — IPRATROPIUM BROMIDE AND ALBUTEROL SULFATE 1 AMPULE: .5; 2.5 SOLUTION RESPIRATORY (INHALATION) at 04:25

## 2023-04-04 RX ADMIN — GUAIFENESIN 600 MG: 600 TABLET, EXTENDED RELEASE ORAL at 02:24

## 2023-04-04 RX ADMIN — ENOXAPARIN SODIUM 40 MG: 100 INJECTION SUBCUTANEOUS at 09:16

## 2023-04-04 RX ADMIN — GUAIFENESIN 600 MG: 600 TABLET, EXTENDED RELEASE ORAL at 09:16

## 2023-04-04 SDOH — SOCIAL STABILITY: SOCIAL INSECURITY: WITHIN THE LAST YEAR, HAVE YOU BEEN HUMILIATED OR EMOTIONALLY ABUSED IN OTHER WAYS BY YOUR PARTNER OR EX-PARTNER?: NO

## 2023-04-04 SDOH — ECONOMIC STABILITY: HOUSING INSECURITY: IN THE LAST 12 MONTHS, HOW MANY PLACES HAVE YOU LIVED?: 2

## 2023-04-04 SDOH — HEALTH STABILITY: PHYSICAL HEALTH: ON AVERAGE, HOW MANY MINUTES DO YOU ENGAGE IN EXERCISE AT THIS LEVEL?: 0 MIN

## 2023-04-04 SDOH — SOCIAL STABILITY: SOCIAL NETWORK
IN A TYPICAL WEEK, HOW MANY TIMES DO YOU TALK ON THE PHONE WITH FAMILY, FRIENDS, OR NEIGHBORS?: MORE THAN THREE TIMES A WEEK

## 2023-04-04 SDOH — ECONOMIC STABILITY: INCOME INSECURITY: HOW HARD IS IT FOR YOU TO PAY FOR THE VERY BASICS LIKE FOOD, HOUSING, MEDICAL CARE, AND HEATING?: NOT HARD AT ALL

## 2023-04-04 SDOH — SOCIAL STABILITY: SOCIAL NETWORK: HOW OFTEN DO YOU ATTENT MEETINGS OF THE CLUB OR ORGANIZATION YOU BELONG TO?: NEVER

## 2023-04-04 SDOH — SOCIAL STABILITY: SOCIAL NETWORK: HOW OFTEN DO YOU GET TOGETHER WITH FRIENDS OR RELATIVES?: TWICE A WEEK

## 2023-04-04 SDOH — ECONOMIC STABILITY: FOOD INSECURITY: WITHIN THE PAST 12 MONTHS, THE FOOD YOU BOUGHT JUST DIDN'T LAST AND YOU DIDN'T HAVE MONEY TO GET MORE.: NEVER TRUE

## 2023-04-04 SDOH — SOCIAL STABILITY: SOCIAL INSECURITY
WITHIN THE LAST YEAR, HAVE YOU BEEN KICKED, HIT, SLAPPED, OR OTHERWISE PHYSICALLY HURT BY YOUR PARTNER OR EX-PARTNER?: NO

## 2023-04-04 SDOH — HEALTH STABILITY: MENTAL HEALTH: HOW OFTEN DO YOU HAVE A DRINK CONTAINING ALCOHOL?: NEVER

## 2023-04-04 SDOH — ECONOMIC STABILITY: TRANSPORTATION INSECURITY
IN THE PAST 12 MONTHS, HAS THE LACK OF TRANSPORTATION KEPT YOU FROM MEDICAL APPOINTMENTS OR FROM GETTING MEDICATIONS?: NO

## 2023-04-04 SDOH — ECONOMIC STABILITY: HOUSING INSECURITY
IN THE LAST 12 MONTHS, WAS THERE A TIME WHEN YOU DID NOT HAVE A STEADY PLACE TO SLEEP OR SLEPT IN A SHELTER (INCLUDING NOW)?: NO

## 2023-04-04 SDOH — ECONOMIC STABILITY: TRANSPORTATION INSECURITY
IN THE PAST 12 MONTHS, HAS LACK OF TRANSPORTATION KEPT YOU FROM MEETINGS, WORK, OR FROM GETTING THINGS NEEDED FOR DAILY LIVING?: NO

## 2023-04-04 SDOH — SOCIAL STABILITY: SOCIAL INSECURITY: WITHIN THE LAST YEAR, HAVE YOU BEEN AFRAID OF YOUR PARTNER OR EX-PARTNER?: NO

## 2023-04-04 SDOH — SOCIAL STABILITY: SOCIAL NETWORK: HOW OFTEN DO YOU ATTEND CHURCH OR RELIGIOUS SERVICES?: NEVER

## 2023-04-04 SDOH — SOCIAL STABILITY: SOCIAL NETWORK: ARE YOU MARRIED, WIDOWED, DIVORCED, SEPARATED, NEVER MARRIED, OR LIVING WITH A PARTNER?: LIVING WITH PARTNER

## 2023-04-04 SDOH — SOCIAL STABILITY: SOCIAL NETWORK
DO YOU BELONG TO ANY CLUBS OR ORGANIZATIONS SUCH AS CHURCH GROUPS UNIONS, FRATERNAL OR ATHLETIC GROUPS, OR SCHOOL GROUPS?: NO

## 2023-04-04 SDOH — HEALTH STABILITY: MENTAL HEALTH: HOW MANY STANDARD DRINKS CONTAINING ALCOHOL DO YOU HAVE ON A TYPICAL DAY?: PATIENT DOES NOT DRINK

## 2023-04-04 SDOH — HEALTH STABILITY: PHYSICAL HEALTH: ON AVERAGE, HOW MANY DAYS PER WEEK DO YOU ENGAGE IN MODERATE TO STRENUOUS EXERCISE (LIKE A BRISK WALK)?: 0 DAYS

## 2023-04-04 SDOH — ECONOMIC STABILITY: FOOD INSECURITY: WITHIN THE PAST 12 MONTHS, YOU WORRIED THAT YOUR FOOD WOULD RUN OUT BEFORE YOU GOT MONEY TO BUY MORE.: NEVER TRUE

## 2023-04-04 SDOH — ECONOMIC STABILITY: INCOME INSECURITY: IN THE LAST 12 MONTHS, WAS THERE A TIME WHEN YOU WERE NOT ABLE TO PAY THE MORTGAGE OR RENT ON TIME?: YES

## 2023-04-04 SDOH — HEALTH STABILITY: MENTAL HEALTH
STRESS IS WHEN SOMEONE FEELS TENSE, NERVOUS, ANXIOUS, OR CAN'T SLEEP AT NIGHT BECAUSE THEIR MIND IS TROUBLED. HOW STRESSED ARE YOU?: RATHER MUCH

## 2023-04-04 SDOH — SOCIAL STABILITY: SOCIAL INSECURITY
WITHIN THE LAST YEAR, HAVE TO BEEN RAPED OR FORCED TO HAVE ANY KIND OF SEXUAL ACTIVITY BY YOUR PARTNER OR EX-PARTNER?: NO

## 2023-04-04 NOTE — ED NOTES
Report given to VIANCA Thomason on med unit @ this time. Patient will be going to room 3.      Priya Ornelas RN  04/04/23 0044

## 2023-04-04 NOTE — ED PROVIDER NOTES
Cephalexin    FAMILYHISTORY     History reviewed. No pertinent family history. SOCIAL HISTORY       Social History     Tobacco Use    Smoking status: Former     Packs/day: 1.00     Years: 5.00     Pack years: 5.00     Types: Cigarettes   Vaping Use    Vaping Use: Every day   Substance Use Topics    Alcohol use: Never    Drug use: Never       SCREENINGS        [place] Coma Scale  Eye Opening: Spontaneous  Best Verbal Response: Oriented  Best Motor Response: Obeys commands  Chad Coma Scale Score: 15                CIWA Assessment  BP: (!) 97/59  Heart Rate: (!) 103           PHYSICAL EXAM  1 or more Elements     ED Triage Vitals   BP Temp Temp Source Heart Rate Resp SpO2 Height Weight   04/03/23 2035 04/03/23 2035 04/03/23 2035 04/03/23 2030 04/03/23 2030 04/03/23 2035 04/03/23 2035 04/03/23 2035   (!) 125/101 98.2 °F (36.8 °C) Oral (!) 122 20 94 % 5' (1.524 m) 145 lb (65.8 kg)       Physical Exam  Vitals and nursing note reviewed. Constitutional:       Appearance: She is well-developed. Comments: Acute respiratory distress; Occasional coughing in the room   HENT:      Head: Normocephalic. Mouth/Throat:      Mouth: Mucous membranes are moist.      Pharynx: Oropharynx is clear. Eyes:      Extraocular Movements: Extraocular movements intact. Pupils: Pupils are equal, round, and reactive to light. Cardiovascular:      Rate and Rhythm: Regular rhythm. Tachycardia present. Heart sounds: No murmur heard. Pulmonary:      Effort: Tachypnea and respiratory distress present. Comments: Slight rhonchi throughout lungs fields with scattered expiratory wheezing throughout  Musculoskeletal:         General: Normal range of motion. Cervical back: Neck supple. Right lower leg: No edema. Left lower leg: No edema. Skin:     General: Skin is warm and dry. Neurological:      Mental Status: She is alert and oriented to person, place, and time.    Psychiatric:         Mood and Affect:

## 2023-04-04 NOTE — FLOWSHEET NOTE
04/04/23 0232   Psychosocial   Psychosocial (WDL) WDL   Neurological   Neuro (WDL) WDL   Level of Consciousness 0   Chad Coma Scale   Eye Opening 4   Best Verbal Response 5   Best Motor Response 6   [place] Coma Scale Score 15   HEENT (Head, Ears, Eyes, Nose, & Throat)   HEENT (WDL) X   Teeth Dentures upper   Respiratory   Respiratory (WDL) X   Respiratory Interventions Cough & deep breathe   Respiratory Pattern Regular   Respiratory Depth Normal   Respiratory Quality/Effort Dyspnea with exertion   Chest Assessment Trachea midline; Chest expansion symmetrical   L Breath Sounds Inspiratory Wheezes; Expiratory Wheezes; Diminished   R Breath Sounds Diminished   Breath Sounds   Right Upper Lobe Diminished   Right Middle Lobe Diminished   Right Lower Lobe Diminished   Left Upper Lobe Inspiratory Wheezes; Expiratory Wheezes   Left Lower Lobe Diminished   Cardiac   Cardiac (WDL) WDL   Gastrointestinal   Abdominal (WDL) WDL   Last BM (including prior to admit) 04/04/23   Genitourinary   Genitourinary (WDL) WDL   Peripheral Vascular   Peripheral Vascular (WDL) WDL   Edema None   Skin Integumentary    Skin Integumentary (WDL) X   Skin Color Pale   Skin Condition/Temp Dry; Warm   Multiple Skin Integrity Sites Yes   Skin Integrity Site 2   Skin Integrity Location 2 Abrasion  (Scabs)   Location 2 Scattered   Preventative Dressing No   Musculoskeletal   Musculoskeletal (WDL) WDL

## 2023-04-04 NOTE — CARDIO/PULMONARY
The patient's room air sat at rest was 86% on room air. I placed her on 2 lpm nc for exertion. Her sat was 96% on 2 lpm nc while walking to the bathroom. She is agreeble to go home with O2 and is in no distress.  Dw,RRT

## 2023-04-04 NOTE — ED NOTES
Patient yelling out short of breath at this time, patient anxious. Respiratory notified.      Rodriguez Berumen RN  04/03/23 2572

## 2023-04-04 NOTE — CARE COORDINATION
The Plan for Transition of Care is related to the following treatment goals: home with home O2    The Patient and/or patient representative was provided with a choice of provider and agrees with the discharge plan. [x] Yes [] No    Freedom of choice list was provided with basic dialogue that supports the patient's individualized plan of care/goals, treatment preferences and shares the quality data associated with the providers. [x] Yes [] No    Pt is stating she is going home today one way or another. Pt requires home O2 -  pt states she will use [facility] for O2. No further DC needs noted at this time. Orders sent to [facility]. O2 will be delivered to bedside prior to DC.

## 2023-04-04 NOTE — ED NOTES
Patient placed on a venti mask at this time per respiratory, patient has improved.      Leonardo Gardiner RN  04/03/23 0991

## 2023-04-04 NOTE — CARE COORDINATION
Lives With: Significant other  Type of Home: House  Home Layout: One level  Home Access: Level entry  Bathroom Shower/Tub: Tub/Shower unit  Bathroom Toilet: Standard  Bathroom Accessibility: Not accessible  Has the patient had two or more falls in the past year or any fall with injury in the past year?: No  ADL Assistance: [address]: Independent  Homemaking Responsibilities: Yes  Ambulation Assistance: Independent  Transfer Assistance: Independent  Active : Yes    Lives with SO.  I at baseline. Needs desat screen on DC.

## 2023-04-04 NOTE — H&P
Short Stay Summary      Patient ID: Jaqui Zazueta      Patient's PCP: Teresa Aceves MD    Admit Date: 4/3/2023     Discharge Date:   4/4/2023    Admitting Physician: Emilia Gallardo MD    Discharge Physician: LANNY Thomas     Reason for this admission:   [facility]    Discharge Diagnoses: Active Hospital Problems    Diagnosis Date Noted    Acute respiratory failure with hypoxia and hypercapnia (HCC) [J96.01, J96.02] 04/04/2023    Transaminitis [R74.01] 04/04/2023    History of intravenous drug abuse ([facility] [address] [F19.11] 04/04/2023    COPD with acute exacerbation ([facility] [address] [J44.1] 04/04/2023    Left against medical advice [Z53.29] 04/04/2023    Positive urine drug screen [R82.5] 04/04/2023       Procedures:  XR CHEST PORTABLE   Final Result      Questionable mild right medial basilar consolidation, which may represent atelectasis or pneumonia. CTA PULMONARY W CONTRAST    (Results Pending)         Consults:   IP CONSULT TO CASE MANAGEMENT  PT OT  Respiratory Therapist      HISTORY OF PRESENT ILLNESS:   The patient is a 29 y.o. female with PMH of IVDA, COPD (diagnosed at [facility] 4-5 years ago per pt report), recurrent bronchitis/pneumonia and former tobacco abuse (currently uses vape) who presented to the ER via EMS with complaints of SOA and wheezing. Started a few hours prior to presenting to the ER. Patient also endorsed a nonproductive cough and GUZMÁN. She is prescribed Symbicort and neb treatments at home but has been out of her medication for 3 days PTA. She states she was recently incarcerated and was only given a short supply upon her release. She denied any fever, chills, nausea, vomiting, diarrhea or chest pain. She also denies any current IV drug abuse. She reports using a vape that contained methamphetamine recently. Vitals upon arrival to the ER: Blood pressure 125/101, heart rate 122, respirations 28, temperature 98.2, O2 sat 94% on 4 L nasal cannula.   Labs: WBC 11.7, hemoglobin 15.3, hematocrit

## 2023-04-04 NOTE — PROGRESS NOTES
Patient Vape and pocket knife locked in patient locker.
Patient requests to leave AMA at this time, Advised patient of risks of leaving AMA and risk of death. Patient alert and oriented x's 4 states that her ride is here and she understands and that she is leaving, asked patient if there was any way that we could accommodate her to convince her to stay for medical treatment, patient again declined. If S&S continue or return, go to nearest ED. Nazia Castro RN in room as patient was being educated. Advised provider Perri CA. Patient belongings (pocket knife and vape) returned to patient, patient stable and ambulatory at time of leaving AMA.
Access: Level entry  Bathroom Shower/Tub: Tub/Shower unit  Bathroom Toilet: Standard  Bathroom Accessibility: Not accessible  Has the patient had two or more falls in the past year or any fall with injury in the past year?: No  ADL Assistance: Independent  Homemaking Assistance: Independent  Homemaking Responsibilities: Yes  Ambulation Assistance: Independent  Transfer Assistance: Independent  Active : Yes       Objective   Heart Rate: (!) 113  Heart Rate Source: Monitor  BP: 109/69  BP Location: Right upper arm  MAP (Calculated): 82  Resp: 20  SpO2: 95 %  O2 Device: None (Room air)          Observation/Palpation  Observation: Sitting upright in bed, NAD, pleasant and cooperative, room air     Balance  Sitting: Intact  Standing: Intact  Gait  Overall Level of Assistance: Stand-by assistance  Distance (ft): 200 Feet     AROM: Within functional limits  PROM: Within functional limits  Strength: Within functional limits  Coordination: Within functional limits  Tone: Normal  Sensation: Intact  ADL  LE Dressing: Independent  Toileting: Independent     Activity Tolerance  Activity Tolerance: Patient tolerated treatment well;Patient tolerated evaluation without incident     Transfers  Stand Pivot Transfers: Independent  Sit to stand: Independent  Stand to sit: Independent  Vision  Vision: Within Functional Limits  Hearing  Hearing: Exceptions to [facility]  Hearing Exceptions: Hard of hearing/hearing concerns; No hearing aid  Cognition  Overall Cognitive Status: WFL  Orientation  Overall Orientation Status: Within Functional Limits    Therapy Time   Individual Concurrent Group Co-treatment   Time In 0900         Time Out 0910         Minutes 10          This note serves as a DC summary in the event of pt discharge.     Theodora Hobbs, OTR/L
Demonstration;Verbal  Barriers to Learning: None  Education Outcome: Verbalized understanding;Demonstrated understanding      Therapy Time   Individual Concurrent Group Co-treatment   Time In 0900         Time Out 0913         Minutes [address] [facility],

## 2023-04-04 NOTE — ED NOTES
Patient with c/o shortness of breath that started this am. Patient with no relief from her albuterol nebs that she has used through the day.      Fernanda Alvarez RN  04/03/23 8082

## 2023-04-04 NOTE — DISCHARGE SUMMARY
Short Stay Summary        Patient ID: Keenan Carroll                 Patient's PCP: Larwance Baumgarten, MD     Admit Date:    4/3/2023      Discharge Date:   4/4/2023     Admitting Physician: Nicci Tse MD     Discharge Physician: LANNY Lloyd      Reason for this admission:   [facility]     Discharge Diagnoses: Active Hospital Problems     Diagnosis Date Noted    Acute respiratory failure with hypoxia and hypercapnia (HCC) [J96.01, J96.02] 04/04/2023    Transaminitis [R74.01] 04/04/2023    History of intravenous drug abuse ([facility] [address] [F19.11] 04/04/2023    COPD with acute exacerbation ([facility] [address] [J44.1] 04/04/2023    Left against medical advice [Z53.29] 04/04/2023    Positive urine drug screen [R82.5] 04/04/2023         Procedures:  XR CHEST PORTABLE   Final Result       Questionable mild right medial basilar consolidation, which may represent atelectasis or pneumonia. CTA PULMONARY W CONTRAST    (Results Pending)            Consults:   IP CONSULT TO CASE MANAGEMENT  PT OT  Respiratory Therapist        HISTORY OF PRESENT ILLNESS:   The patient is a 29 y.o. female with PMH of IVDA, COPD (diagnosed at [facility] 4-5 years ago per pt report), recurrent bronchitis/pneumonia and former tobacco abuse (currently uses vape) who presented to the ER via EMS with complaints of SOA and wheezing. Started a few hours prior to presenting to the ER. Patient also endorsed a nonproductive cough and GUZMÁN. She is prescribed Symbicort and neb treatments at home but has been out of her medication for 3 days PTA. She states she was recently incarcerated and was only given a short supply upon her release. She denied any fever, chills, nausea, vomiting, diarrhea or chest pain. She also denies any current IV drug abuse. She reports using a vape that contained methamphetamine recently. Vitals upon arrival to the ER: Blood pressure 125/101, heart rate 122, respirations 28, temperature 98.2, O2 sat 94% on 4 L nasal cannula.   Labs: WBC

## 2023-04-04 NOTE — ACP (ADVANCE CARE PLANNING)
Advance Care Planning     General Advance Care Planning (ACP) Conversation    Date of Conversation: 4/3/2023  Conducted with: Patient with Decision Making Capacity per staff on admit    Healthcare Decision Maker:    Primary Decision Maker: Tesfaye Young - Other Relative - [phone]  Click here to complete [address] including selection of the Healthcare Decision Maker Relationship (ie \"Primary\"). Today we discussed Healthcare Decision Makers. The patient is considering options.     Content/Action Overview:  Has NO ACP documents/care preferences - information provided, considering goals and options  Reviewed DNR/DNI and patient elects Full Code (Attempt Resuscitation)    Length of Voluntary ACP Conversation in minutes:  <16 minutes (Non-Billable)

## 2023-04-05 LAB
HAV IGM SERPL QL IA: ABNORMAL
HBV CORE IGM SERPL QL IA: ABNORMAL
HBV SURFACE AG SERPL QL IA: ABNORMAL
HCV AB SERPL QL IA: REACTIVE

## 2023-04-08 LAB — BACTERIA BLD CULT: NORMAL

## 2023-06-19 ENCOUNTER — TELEPHONE (OUTPATIENT)
Dept: PHARMACY | Facility: HOSPITAL | Age: 33
End: 2023-06-19

## 2023-06-19 NOTE — TELEPHONE ENCOUNTER
Patient was referred to Hep C clinic by [facility] on 4/6/2023. Called pt and left message stating my, that I work for [employer] and [employer], and asked the pt to call me back. Will try calling pt again at a later time.     Erika Wong, RickieD

## 2023-07-22 ENCOUNTER — HOSPITAL ENCOUNTER (EMERGENCY)
Facility: HOSPITAL | Age: 33
Discharge: HOME | End: 2023-07-22
Attending: EMERGENCY MEDICINE | Admitting: EMERGENCY MEDICINE
Payer: MEDICAID

## 2023-07-22 VITALS
SYSTOLIC BLOOD PRESSURE: 109 MMHG | WEIGHT: 140 LBS | RESPIRATION RATE: 18 BRPM | TEMPERATURE: 98.1 F | HEIGHT: 60 IN | HEART RATE: 106 BPM | BODY MASS INDEX: 27.48 KG/M2 | DIASTOLIC BLOOD PRESSURE: 76 MMHG | OXYGEN SATURATION: 99 %

## 2023-07-22 DIAGNOSIS — L02.91 ABSCESS: Primary | ICD-10-CM

## 2023-07-22 PROCEDURE — 99282 EMERGENCY DEPT VISIT SF MDM: CPT

## 2023-07-22 RX ORDER — LIDOCAINE HYDROCHLORIDE AND EPINEPHRINE BITARTRATE 20; .01 MG/ML; MG/ML
10 INJECTION, SOLUTION SUBCUTANEOUS ONCE
Status: COMPLETED | OUTPATIENT
Start: 2023-07-22 | End: 2023-07-22

## 2023-07-22 RX ORDER — SULFAMETHOXAZOLE AND TRIMETHOPRIM 800; 160 MG/1; MG/1
1 TABLET ORAL 2 TIMES DAILY
Qty: 14 TABLET | Refills: 0 | Status: SHIPPED | OUTPATIENT
Start: 2023-07-22 | End: 2023-07-29

## 2023-07-22 RX ADMIN — LIDOCAINE HYDROCHLORIDE,EPINEPHRINE BITARTRATE 10 ML: 20; .01 INJECTION, SOLUTION INFILTRATION; PERINEURAL at 23:40

## 2023-07-22 NOTE — Clinical Note
[facility] EMERGENCY DEPARTMENT  [address] [address]  Phone: [phone]    Charity Luna was seen and treated in our emergency department on 7/22/2023.  She may return to work on 07/24/2023.         Thank you for choosing [facility].    Frances Abraham MD

## 2023-07-23 NOTE — ED PROVIDER NOTES
Subjective  History of Present Illness:    Chief Complaint: Abscess  History of Present Illness: This is a 33-year-old female patient comes into the ED today complaining of an abscess under her left armpit that started about a week ago and is progressively worsened over the interval.  Patient states is very painful rates her pain is 9 out of 10 with movement and palpation.      Nurses Notes reviewed and agree, including vitals, allergies, social history and prior medical history.       Allergies:    Ceftin [cefuroxime] and Codeine      Past Surgical History:   Procedure Laterality Date    ADENOIDECTOMY      CHOLECYSTECTOMY      EAR TUBES      TONSILLECTOMY           Social History     Socioeconomic History    Marital status: Single   Tobacco Use    Smoking status: Every Day     Packs/day: 0.50     Types: Cigarettes    Smokeless tobacco: Never   Substance and Sexual Activity    Alcohol use: No    Drug use: No    Sexual activity: Defer     Partners: Male         Family History   Problem Relation Age of Onset    Diabetes Mother     Heart failure Mother     Asthma Mother     COPD Mother     Heart failure Father     Hypertension Father     Hyperlipidemia Father     No Known Problems Sister     No Known Problems Brother        REVIEW OF SYSTEMS: All systems reviewed and not pertinent unless noted.    Review of Systems   Skin:  Positive for color change and wound.   All other systems reviewed and are negative.    Objective    Physical Exam  Vitals and nursing note reviewed.   Constitutional:       Appearance: Normal appearance.   HENT:      Head: Normocephalic and atraumatic.   Eyes:      Extraocular Movements: Extraocular movements intact.      Pupils: Pupils are equal, round, and reactive to light.   Cardiovascular:      Rate and Rhythm: Normal rate and regular rhythm.      Pulses: Normal pulses.      Heart sounds: Normal heart sounds.   Pulmonary:      Effort: Pulmonary effort is normal.      Breath sounds: Normal  breath sounds.   Abdominal:      General: Abdomen is flat. Bowel sounds are normal.      Palpations: Abdomen is soft.   Musculoskeletal:      Cervical back: Normal range of motion and neck supple.   Skin:     Capillary Refill: Capillary refill takes less than 2 seconds.      Findings: Abscess and erythema present.      Comments: Approximately 2 cm abscess to the left axilla   Neurological:      General: No focal deficit present.      Mental Status: She is alert and oriented to person, place, and time. Mental status is at baseline.      GCS: GCS eye subscore is 4. GCS verbal subscore is 5. GCS motor subscore is 6.      Sensory: Sensation is intact.      Motor: Motor function is intact.      Gait: Gait is intact.   Psychiatric:         Attention and Perception: Attention and perception normal.         Mood and Affect: Mood and affect normal.         Speech: Speech normal.         Behavior: Behavior normal. Behavior is cooperative.         Incision & Drainage    Date/Time: 7/22/2023 11:44 PM  Performed by: Albin Hanson APRN  Authorized by: Frances Abraham MD     Consent:     Consent obtained:  Verbal    Consent given by:  Patient    Risks discussed:  Bleeding, incomplete drainage, pain and infection    Alternatives discussed:  No treatment, delayed treatment, alternative treatment and observation  Universal protocol:     Procedure explained and questions answered to patient or proxy's satisfaction: yes      Relevant documents present and verified: yes      Test results available : no      Imaging studies available: no      Required blood products, implants, devices, and special equipment available: no      Site/side marked: yes      Immediately prior to procedure, a time out was called: yes      Patient identity confirmed:  Verbally with patient  Location:     Type:  Abscess    Location:  Trunk    Trunk location:  Chest  Pre-procedure details:     Skin preparation:  Chlorhexidine with alcohol  Sedation:      Sedation type:  None  Anesthesia:     Anesthesia method:  Local infiltration    Local anesthetic:  Lidocaine 1% WITH epi  Procedure type:     Complexity:  Simple  Procedure details:     Incision types:  Stab incision    Incision depth:  Subcutaneous    Drainage:  Purulent    Drainage amount:  Moderate    Wound treatment:  Wound left open  Post-procedure details:     Procedure completion:  Tolerated well, no immediate complications    ED Course:         Lab Results (last 24 hours)       ** No results found for the last 24 hours. **             No radiology results from the last 24 hrs     Medical Decision Making  33-year-old female patient comes into the ED today complaining of a left axillary abscess    Patient states this has been ongoing and is complaint of pain    DDX: includes but is not limited to:    Amount and/or Complexity of Data Reviewed  Discussion of management or test interpretation with external provider(s): Discussed assessment, treatment and plan with ER attending    Risk  Prescription drug management.  Risk Details: Patient has been diagnosed with left axillary abscess and will be discharged home.  Patient requested to follow-up with primary care provider within the next 7 days for reevaluation.                  Final diagnoses:   Abscess          Albin Hanson, APRN  07/22/23 3944

## 2023-08-09 ENCOUNTER — HOSPITAL ENCOUNTER (EMERGENCY)
Facility: HOSPITAL | Age: 33
Discharge: HOME | End: 2023-08-09
Attending: EMERGENCY MEDICINE | Admitting: EMERGENCY MEDICINE
Payer: MEDICAID

## 2023-08-09 VITALS
TEMPERATURE: 100.5 F | DIASTOLIC BLOOD PRESSURE: 72 MMHG | RESPIRATION RATE: 22 BRPM | OXYGEN SATURATION: 97 % | HEART RATE: 108 BPM | WEIGHT: 141 LBS | BODY MASS INDEX: 26.62 KG/M2 | SYSTOLIC BLOOD PRESSURE: 110 MMHG | HEIGHT: 61 IN

## 2023-08-09 DIAGNOSIS — N76.4 ABSCESS OF VULVA: Primary | ICD-10-CM

## 2023-08-09 PROCEDURE — 99283 EMERGENCY DEPT VISIT LOW MDM: CPT

## 2023-08-09 PROCEDURE — 0 LIDOCAINE 1 % SOLUTION: Performed by: NURSE PRACTITIONER

## 2023-08-09 RX ORDER — IBUPROFEN 600 MG/1
600 TABLET ORAL EVERY 6 HOURS PRN
Qty: 20 TABLET | Refills: 0 | Status: SHIPPED | OUTPATIENT
Start: 2023-08-09 | End: 2023-08-14

## 2023-08-09 RX ORDER — LIDOCAINE HYDROCHLORIDE 10 MG/ML
10 INJECTION, SOLUTION INFILTRATION; PERINEURAL ONCE
Status: COMPLETED | OUTPATIENT
Start: 2023-08-09 | End: 2023-08-09

## 2023-08-09 RX ORDER — CLINDAMYCIN HYDROCHLORIDE 300 MG/1
300 CAPSULE ORAL 3 TIMES DAILY
Qty: 21 CAPSULE | Refills: 0 | Status: SHIPPED | OUTPATIENT
Start: 2023-08-09 | End: 2023-08-16

## 2023-08-09 RX ADMIN — LIDOCAINE HYDROCHLORIDE 10 ML: 10 INJECTION, SOLUTION INFILTRATION; PERINEURAL at 12:30

## 2023-08-09 RX ADMIN — Medication 1 APPLICATION: at 12:29

## 2023-08-09 NOTE — ED PROVIDER NOTES
"Subjective:  History of Present Illness:    Patient is a 33-year-old female with history of asthma and cholelithiasis.  She presents to the ER today with pain and edema to left labia majora.  Patient reports that pain has gradually increased over the last week.  Has noticed immense amount of swelling to the left labia majora.  Denies any type of discharge.  Denies fever.  Patient denies OTC medication or home remedy.  Denies alleviating or exacerbating factors.      Nurses Notes reviewed and agree, including vitals, allergies, social history and prior medical history.     REVIEW OF SYSTEMS: All systems reviewed and not pertinent unless noted.  Review of Systems   Skin:  Positive for wound.        Abscess to left labia majora.     Past Medical History:   Diagnosis Date    Asthma     Cholelithiasis        Allergies:    Ceftin [cefuroxime], Codeine, and Sulfamethoxazole-trimethoprim      Past Surgical History:   Procedure Laterality Date    ADENOIDECTOMY      CHOLECYSTECTOMY      EAR TUBES      TONSILLECTOMY           Social History     Socioeconomic History    Marital status: Single   Tobacco Use    Smoking status: Every Day     Packs/day: 0.50     Types: Cigarettes    Smokeless tobacco: Never   Substance and Sexual Activity    Alcohol use: No    Drug use: No    Sexual activity: Defer     Partners: Male         Family History   Problem Relation Age of Onset    Diabetes Mother     Heart failure Mother     Asthma Mother     COPD Mother     Heart failure Father     Hypertension Father     Hyperlipidemia Father     No Known Problems Sister     No Known Problems Brother        Objective  Physical Exam:  /72 (BP Location: Left arm, Patient Position: Sitting)   Pulse 108   Temp 100.5 øF (38.1 øC) (Oral)   Resp 22   Ht 154.9 cm (61\")   Wt 64 kg (141 lb)   LMP  (Approximate) Comment: 2020  SpO2 97%   BMI 26.64 kg/mý      Physical Exam  Vitals and nursing note reviewed.   Constitutional:       Appearance: Normal " appearance. She is normal weight.   HENT:      Head: Normocephalic and atraumatic.      Nose: Nose normal.      Mouth/Throat:      Mouth: Mucous membranes are moist.      Pharynx: Oropharynx is clear.   Eyes:      Extraocular Movements: Extraocular movements intact.      Conjunctiva/sclera: Conjunctivae normal.      Pupils: Pupils are equal, round, and reactive to light.   Cardiovascular:      Rate and Rhythm: Normal rate.   Pulmonary:      Effort: Pulmonary effort is normal.   Abdominal:      General: Abdomen is flat.      Palpations: Abdomen is soft.   Genitourinary:     Comments: Left labia majora with lump consistent with abscess.  Lump does feel confluent.  Surrounding tissues are with erythema and edema.  No discharge is noted at this time.  Musculoskeletal:         General: Normal range of motion.      Cervical back: Normal range of motion and neck supple.   Skin:     General: Skin is warm.      Capillary Refill: Capillary refill takes less than 2 seconds.   Neurological:      General: No focal deficit present.      Mental Status: She is alert and oriented to person, place, and time. Mental status is at baseline.   Psychiatric:         Mood and Affect: Mood normal.         Behavior: Behavior normal.         Thought Content: Thought content normal.         Judgment: Judgment normal.       Incision & Drainage    Date/Time: 8/9/2023 1:05 PM  Performed by: Venkat Mccarthy APRN  Authorized by: Pratik Magaña MD     Consent:     Consent obtained:  Verbal and written    Consent given by:  Patient    Risks, benefits, and alternatives were discussed: yes      Risks discussed:  Bleeding, incomplete drainage, pain and damage to other organs  Universal protocol:     Procedure explained and questions answered to patient or proxy's satisfaction: yes      Relevant documents present and verified: yes      Test results available : no      Imaging studies available: no      Required blood products, implants, devices,  and special equipment available: no      Site/side marked: yes      Immediately prior to procedure, a time out was called: yes      Patient identity confirmed:  Verbally with patient, hospital-assigned identification number and arm band  Location:     Type:  Abscess    Size:  Order sized    Location:  Anogenital    Anogenital location:  Vulva  Pre-procedure details:     Skin preparation:  Antiseptic wash  Sedation:     Sedation type:  None  Anesthesia:     Anesthesia method:  Local infiltration    Local anesthetic:  Lidocaine 1% w/o epi  Procedure type:     Complexity:  Simple  Procedure details:     Ultrasound guidance: no      Needle aspiration: no      Incision types:  Stab incision    Wound management:  Probed and deloculated    Drainage:  Serosanguinous    Drainage amount:  Scant    Wound treatment:  Wound left open    Packing materials:  None  Post-procedure details:     Procedure completion:  Procedure terminated at patient's request  Comments:      There were no complications during procedure however patient request to discontinue.  We will send patient home with antibiotic.    ED Course:         Lab Results (last 24 hours)       ** No results found for the last 24 hours. **             No radiology results from the last 24 hrs       MDM      Initial impression of presenting illness: Patient is a 33-year-old female with history of asthma and cholelithiasis.  She presents to the ER today with pain and edema to left labia majora.  Patient reports that pain has gradually increased over the last week.  Has noticed immense amount of swelling to the left labia majora.  Denies any type of discharge.  Denies fever.  Patient denies OTC medication or home remedy.  Denies alleviating or exacerbating factors.    DDX: includes but is not limited to: Cellulitis, abscess, vaginal candidiasis.    Patient arrives stable with vitals interpreted by myself.     Pertinent features from physical exam: Physical assessment is  consistent with lump to left labia majora.  Adjacent tissues are with erythema edema.  There is no discharge noted..    Initial diagnostic plan: N/A    Results from initial plan were reviewed and interpreted by me revealing N/A    Diagnostic information from other sources: Chart review    Interventions / Re-evaluation: I&D completed at bedside.  There was a small amount of serosanguineous drainage.  There were no complications however patient did not tolerate well.  Discontinued procedure at request of patient.    Results/clinical rationale were discussed with patient    Consultations/Discussion of results with other physicians: N/A    Disposition plan: Discharge patient home.  Given the patient is allergic to Bactrim and cephalexin.  Will send patient with clindamycin.  Have instructed patient to monitor for worsening signs of infection.  This includes erythema edema drainage.  Systemic fever.  She is hemodynamically stable nontoxic-appearing appropriate for discharge.  Patient to follow-up with PCP in 1 week.  Follow-up with ER for new or worsening symptoms.  -----        Final diagnoses:   Abscess of vulva          Venkat Mccarthy, APRN  08/09/23 0533

## 2023-08-09 NOTE — Clinical Note
[facility] EMERGENCY DEPARTMENT  [address] [address]  Phone: [phone]    Charity Luna was seen and treated in our emergency department on 8/9/2023.  She may return to work on 08/14/2023.         Thank you for choosing [facility].    Pratik Magaña MD

## 2023-11-02 ENCOUNTER — TRANSCRIBE ORDERS (OUTPATIENT)
Dept: LAB | Facility: HOSPITAL | Age: 33
End: 2023-11-02
Payer: MEDICAID

## 2023-11-02 ENCOUNTER — LAB (OUTPATIENT)
Dept: LAB | Facility: HOSPITAL | Age: 33
End: 2023-11-02
Payer: MEDICAID

## 2023-11-02 DIAGNOSIS — H20.9 UVEITIS: ICD-10-CM

## 2023-11-02 DIAGNOSIS — H20.9 UVEITIS: Primary | ICD-10-CM

## 2023-11-02 LAB
ANION GAP SERPL CALCULATED.3IONS-SCNC: 7 MMOL/L (ref 5–15)
BASOPHILS # BLD AUTO: 0.05 10*3/MM3 (ref 0–0.2)
BASOPHILS NFR BLD AUTO: 0.8 % (ref 0–1.5)
BUN SERPL-MCNC: 7 MG/DL (ref 6–20)
BUN/CREAT SERPL: 9.2 (ref 7–25)
CALCIUM SPEC-SCNC: 9.1 MG/DL (ref 8.6–10.5)
CHLORIDE SERPL-SCNC: 102 MMOL/L (ref 98–107)
CO2 SERPL-SCNC: 28 MMOL/L (ref 22–29)
CREAT SERPL-MCNC: 0.76 MG/DL (ref 0.57–1)
DEPRECATED RDW RBC AUTO: 37.6 FL (ref 37–54)
EGFRCR SERPLBLD CKD-EPI 2021: 106.9 ML/MIN/1.73
EOSINOPHIL # BLD AUTO: 0.57 10*3/MM3 (ref 0–0.4)
EOSINOPHIL NFR BLD AUTO: 9.5 % (ref 0.3–6.2)
ERYTHROCYTE [DISTWIDTH] IN BLOOD BY AUTOMATED COUNT: 12.6 % (ref 12.3–15.4)
ERYTHROCYTE [SEDIMENTATION RATE] IN BLOOD: 36 MM/HR (ref 0–20)
GLUCOSE SERPL-MCNC: 81 MG/DL (ref 65–99)
HCT VFR BLD AUTO: 37.8 % (ref 34–46.6)
HGB BLD-MCNC: 12.6 G/DL (ref 12–15.9)
HIV1 P24 AG SER QL: NORMAL
HIV1+2 AB SER QL: NORMAL
IGG1 SER-MCNC: 1652 MG/DL (ref 700–1600)
IGM SERPL-MCNC: 362 MG/DL (ref 40–230)
IMM GRANULOCYTES # BLD AUTO: 0.02 10*3/MM3 (ref 0–0.05)
IMM GRANULOCYTES NFR BLD AUTO: 0.3 % (ref 0–0.5)
LYMPHOCYTES # BLD AUTO: 1.78 10*3/MM3 (ref 0.7–3.1)
LYMPHOCYTES NFR BLD AUTO: 29.7 % (ref 19.6–45.3)
MCH RBC QN AUTO: 28.1 PG (ref 26.6–33)
MCHC RBC AUTO-ENTMCNC: 33.3 G/DL (ref 31.5–35.7)
MCV RBC AUTO: 84.2 FL (ref 79–97)
MONOCYTES # BLD AUTO: 0.57 10*3/MM3 (ref 0.1–0.9)
MONOCYTES NFR BLD AUTO: 9.5 % (ref 5–12)
NEUTROPHILS NFR BLD AUTO: 3 10*3/MM3 (ref 1.7–7)
NEUTROPHILS NFR BLD AUTO: 50.2 % (ref 42.7–76)
NRBC BLD AUTO-RTO: 0 /100 WBC (ref 0–0.2)
PLATELET # BLD AUTO: 351 10*3/MM3 (ref 140–450)
PMV BLD AUTO: 10.7 FL (ref 6–12)
POTASSIUM SERPL-SCNC: 4.3 MMOL/L (ref 3.5–5.2)
RBC # BLD AUTO: 4.49 10*6/MM3 (ref 3.77–5.28)
SODIUM SERPL-SCNC: 137 MMOL/L (ref 136–145)
WBC NRBC COR # BLD: 5.99 10*3/MM3 (ref 3.4–10.8)

## 2023-11-02 PROCEDURE — 86593 SYPHILIS TEST NON-TREP QUANT: CPT

## 2023-11-02 PROCEDURE — 82784 ASSAY IGA/IGD/IGG/IGM EACH: CPT

## 2023-11-02 PROCEDURE — 36415 COLL VENOUS BLD VENIPUNCTURE: CPT

## 2023-11-02 PROCEDURE — 80048 BASIC METABOLIC PNL TOTAL CA: CPT

## 2023-11-02 PROCEDURE — 86780 TREPONEMA PALLIDUM: CPT

## 2023-11-02 PROCEDURE — 85025 COMPLETE CBC W/AUTO DIFF WBC: CPT

## 2023-11-02 PROCEDURE — G0475 HIV COMBINATION ASSAY: HCPCS

## 2023-11-02 PROCEDURE — 86592 SYPHILIS TEST NON-TREP QUAL: CPT

## 2023-11-02 PROCEDURE — 86038 ANTINUCLEAR ANTIBODIES: CPT

## 2023-11-02 PROCEDURE — 85652 RBC SED RATE AUTOMATED: CPT

## 2023-11-03 LAB
ANA SER QL: NEGATIVE
RPR SER QL: REACTIVE
RPR SER-TITR: ABNORMAL {TITER}

## 2023-11-06 LAB — TREPONEMA PALLIDUM IGG+IGM AB [PRESENCE] IN SERUM OR PLASMA BY IMMUNOASSAY: REACTIVE

## 2023-11-08 ENCOUNTER — HOSPITAL ENCOUNTER (EMERGENCY)
Facility: HOSPITAL | Age: 33
Discharge: LEFT BEFORE DISCHARGE | End: 2023-11-08
Attending: STUDENT IN AN ORGANIZED HEALTH CARE EDUCATION/TRAINING PROGRAM | Admitting: EMERGENCY MEDICINE
Payer: MEDICAID

## 2023-11-08 VITALS
SYSTOLIC BLOOD PRESSURE: 103 MMHG | BODY MASS INDEX: 23.6 KG/M2 | HEIGHT: 61 IN | WEIGHT: 125 LBS | OXYGEN SATURATION: 96 % | HEART RATE: 73 BPM | TEMPERATURE: 97.9 F | RESPIRATION RATE: 18 BRPM | DIASTOLIC BLOOD PRESSURE: 72 MMHG

## 2023-11-08 DIAGNOSIS — A52.71 OCULAR SYPHILIS: Primary | ICD-10-CM

## 2023-11-08 LAB
ALBUMIN SERPL-MCNC: 3.8 G/DL (ref 3.5–5.2)
ALBUMIN/GLOB SERPL: 1.1 G/DL
ALP SERPL-CCNC: 118 U/L (ref 39–117)
ALT SERPL W P-5'-P-CCNC: 50 U/L (ref 1–33)
ANION GAP SERPL CALCULATED.3IONS-SCNC: 7.1 MMOL/L (ref 5–15)
AST SERPL-CCNC: 36 U/L (ref 1–32)
BASOPHILS # BLD AUTO: 0.06 10*3/MM3 (ref 0–0.2)
BASOPHILS NFR BLD AUTO: 0.9 % (ref 0–1.5)
BILIRUB SERPL-MCNC: 0.2 MG/DL (ref 0–1.2)
BUN SERPL-MCNC: 7 MG/DL (ref 6–20)
BUN/CREAT SERPL: 9.5 (ref 7–25)
CALCIUM SPEC-SCNC: 8.9 MG/DL (ref 8.6–10.5)
CHLORIDE SERPL-SCNC: 100 MMOL/L (ref 98–107)
CO2 SERPL-SCNC: 29.9 MMOL/L (ref 22–29)
CREAT SERPL-MCNC: 0.74 MG/DL (ref 0.57–1)
DEPRECATED RDW RBC AUTO: 44.7 FL (ref 37–54)
EGFRCR SERPLBLD CKD-EPI 2021: 110.4 ML/MIN/1.73
EOSINOPHIL # BLD AUTO: 0.38 10*3/MM3 (ref 0–0.4)
EOSINOPHIL NFR BLD AUTO: 5.4 % (ref 0.3–6.2)
ERYTHROCYTE [DISTWIDTH] IN BLOOD BY AUTOMATED COUNT: 13.7 % (ref 12.3–15.4)
GLOBULIN UR ELPH-MCNC: 3.6 GM/DL
GLUCOSE SERPL-MCNC: 94 MG/DL (ref 65–99)
HCT VFR BLD AUTO: 43.3 % (ref 34–46.6)
HGB BLD-MCNC: 13.8 G/DL (ref 12–15.9)
HOLD SPECIMEN: NORMAL
HOLD SPECIMEN: NORMAL
IMM GRANULOCYTES # BLD AUTO: 0.02 10*3/MM3 (ref 0–0.05)
IMM GRANULOCYTES NFR BLD AUTO: 0.3 % (ref 0–0.5)
LYMPHOCYTES # BLD AUTO: 1.96 10*3/MM3 (ref 0.7–3.1)
LYMPHOCYTES NFR BLD AUTO: 28 % (ref 19.6–45.3)
MCH RBC QN AUTO: 28.2 PG (ref 26.6–33)
MCHC RBC AUTO-ENTMCNC: 31.9 G/DL (ref 31.5–35.7)
MCV RBC AUTO: 88.5 FL (ref 79–97)
MONOCYTES # BLD AUTO: 0.57 10*3/MM3 (ref 0.1–0.9)
MONOCYTES NFR BLD AUTO: 8.1 % (ref 5–12)
NEUTROPHILS NFR BLD AUTO: 4.02 10*3/MM3 (ref 1.7–7)
NEUTROPHILS NFR BLD AUTO: 57.3 % (ref 42.7–76)
NRBC BLD AUTO-RTO: 0 /100 WBC (ref 0–0.2)
PLATELET # BLD AUTO: 206 10*3/MM3 (ref 140–450)
PMV BLD AUTO: 10.6 FL (ref 6–12)
POTASSIUM SERPL-SCNC: 4 MMOL/L (ref 3.5–5.2)
PROT SERPL-MCNC: 7.4 G/DL (ref 6–8.5)
RBC # BLD AUTO: 4.89 10*6/MM3 (ref 3.77–5.28)
SODIUM SERPL-SCNC: 137 MMOL/L (ref 136–145)
WBC NRBC COR # BLD: 7.01 10*3/MM3 (ref 3.4–10.8)
WHOLE BLOOD HOLD COAG: NORMAL
WHOLE BLOOD HOLD SPECIMEN: NORMAL

## 2023-11-08 PROCEDURE — 86593 SYPHILIS TEST NON-TREP QUANT: CPT | Performed by: PHYSICIAN ASSISTANT

## 2023-11-08 PROCEDURE — 85025 COMPLETE CBC W/AUTO DIFF WBC: CPT

## 2023-11-08 PROCEDURE — 86592 SYPHILIS TEST NON-TREP QUAL: CPT | Performed by: PHYSICIAN ASSISTANT

## 2023-11-08 PROCEDURE — 80053 COMPREHEN METABOLIC PANEL: CPT

## 2023-11-08 PROCEDURE — 99283 EMERGENCY DEPT VISIT LOW MDM: CPT

## 2023-11-08 PROCEDURE — 36415 COLL VENOUS BLD VENIPUNCTURE: CPT

## 2023-11-08 RX ORDER — PENICILLIN G 3000000 [IU]/50ML
3 INJECTION, SOLUTION INTRAVENOUS
Status: DISCONTINUED | OUTPATIENT
Start: 2023-11-08 | End: 2023-11-08 | Stop reason: HOSPADM

## 2023-11-08 RX ORDER — SODIUM CHLORIDE 0.9 % (FLUSH) 0.9 %
10 SYRINGE (ML) INJECTION AS NEEDED
Status: DISCONTINUED | OUTPATIENT
Start: 2023-11-08 | End: 2023-11-08 | Stop reason: HOSPADM

## 2023-11-08 RX ADMIN — FLUORESCEIN SODIUM 1 STRIP: 1 STRIP OPHTHALMIC at 17:48

## 2023-11-08 NOTE — ED NOTES
Contacted [facility] per Fallon NOBLES request. Call transferred to [facility] at this time.

## 2023-11-08 NOTE — ED PROVIDER NOTES
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  Patient states that she went to [facility] and they referred her to Dr. Contreras, optometrist, who referred her to a retinal specialist.  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  She states that he told her to go to [facility] but she came here instead.  She states that she has had progressive blurry vision in that eye as well as floaters.      Nurses Notes reviewed and agree, including vitals, allergies, social history and prior medical history.     REVIEW OF SYSTEMS: All systems reviewed and not pertinent unless noted.  Review of Systems   Eyes:  Positive for visual disturbance.   All other systems reviewed and are negative.      Past Medical History:   Diagnosis Date    Asthma     Cholelithiasis        Allergies:    Ceftin [cefuroxime], Codeine, and Sulfamethoxazole-trimethoprim      Past Surgical History:   Procedure Laterality Date    ADENOIDECTOMY      CHOLECYSTECTOMY      EAR TUBES      TONSILLECTOMY           Social History     Socioeconomic History    Marital status: Single   Tobacco Use    Smoking status: Every Day     Packs/day: .5     Types: Cigarettes    Smokeless tobacco: Never   Substance and Sexual Activity    Alcohol use: No    Drug use: No    Sexual activity: Defer     Partners: Male         Family History   Problem Relation Age of Onset    Diabetes Mother     Heart failure Mother     Asthma Mother     COPD Mother     Heart failure Father     Hypertension Father     Hyperlipidemia Father     No Known Problems Sister     No Known Problems Brother        Objective  Physical Exam:  /72   Pulse 73    "Temp 97.9 °F (36.6 °C) (Oral)   Resp 18   Ht 154.9 cm (61\")   Wt 56.7 kg (125 lb)   SpO2 96%   BMI 23.62 kg/m²      Physical Exam  Vitals and nursing note reviewed.   Constitutional:       General: She is not in acute distress.     Appearance: She is not toxic-appearing.   HENT:      Head: Normocephalic and atraumatic.      Right Ear: External ear normal.      Left Ear: External ear normal.      Nose: Nose normal.   Eyes:      Extraocular Movements: Extraocular movements intact.      Conjunctiva/sclera: Conjunctivae normal.   Cardiovascular:      Rate and Rhythm: Normal rate.   Pulmonary:      Effort: Pulmonary effort is normal. No respiratory distress.   Abdominal:      General: There is no distension.   Musculoskeletal:         General: Normal range of motion.      Cervical back: Normal range of motion and neck supple.   Skin:     General: Skin is warm and dry.   Neurological:      General: No focal deficit present.      Mental Status: She is alert and oriented to person, place, and time.   Psychiatric:         Mood and Affect: Mood normal.         Behavior: Behavior normal.         Procedures    ED Course:    ED Course as of 11/08/23 1916 Wed Nov 08, 2023 1749 Spoke with Dr. Cornelius with Ophthalmology at .  She would be willing to accept the patient but unfortunately they are on divert and have no ER beds available or medicine beds.  They report that it will likely be a while before they can get a bed.  They recommended reaching out to [facility] to see if they have availability.  Dr. Cornelius states that if she is not able to go to [facility], it would really be a matter of giving the patient IV antibiotics and to she can get somewhere or follow-up with the retinal specialist. [AP]   1812 Patient refused [facility] transfer as she states she does not want to go that far. Asked if she could wait until  had a bed available. Advised her it is her right to refuse transfer to other facilities but do not know when  " "will be able to take her. She states she is fine with staying here until then. Called  back and they state they refuse to place her on waiting list due to \"lack of severity\". [AP]   1845 Spoke with Dr. Green, hospitalist, who states that patient is not appropriate for admission here because we do not have Infectious Disease or Ophthalmology services. He states her vision could worsen and if things get worse, we do not have the appropriate services here. He recommends trying [facility] if patient is agreeable as she really needs to be somewhere with the appropriate services. [AP]   1910 Discussed with patient that we are unable to accept her here for admission and they will not accept her at  right now.  Advised her that we cannot keep her in the ER for 10 to 14 days for IV antibiotics as we also do not have the specialists here to care for ocular syphilis.  Advised patient that we can call [facility] and other facilities to try and get her transferred so she can get the care that she needs.  Patient declined and states she wants to leave AGAINST MEDICAL ADVICE as she states that she will talk to her dad and go to a facility that has ophthalmology. She states she may ask him to take her to [facility]. Advised patient we can transfer her and she can still go by private vehicle. She still wants to leave AMA. [AP]      ED Course User Index  [AP] Fallon Oh, GIULIANO       Lab Results (last 24 hours)       Procedure Component Value Units Date/Time    CBC & Differential [[id number]]  (Normal) Collected: 11/08/23 1400    Specimen: Blood Updated: 11/08/23 1404    Narrative:      The following orders were created for panel order CBC & Differential.  Procedure                               Abnormality         Status                     ---------                               -----------         ------                     CBC Auto Differential[[id number]]        Normal              Final result                 Please view results for these " tests on the individual orders.    Comprehensive Metabolic Panel [[id number]]  (Abnormal) Collected: 11/08/23 1400    Specimen: Blood Updated: 11/08/23 1428     Glucose 94 mg/dL      BUN 7 mg/dL      Creatinine 0.74 mg/dL      Sodium 137 mmol/L      Potassium 4.0 mmol/L      Comment: Slight hemolysis detected by analyzer. Result may be falsely elevated.        Chloride 100 mmol/L      CO2 29.9 mmol/L      Calcium 8.9 mg/dL      Total Protein 7.4 g/dL      Albumin 3.8 g/dL      ALT (SGPT) 50 U/L      AST (SGOT) 36 U/L      Alkaline Phosphatase 118 U/L      Total Bilirubin 0.2 mg/dL      Globulin 3.6 gm/dL      A/G Ratio 1.1 g/dL      BUN/Creatinine Ratio 9.5     Anion Gap 7.1 mmol/L      eGFR 110.4 mL/min/1.73     Narrative:      GFR Normal >60  Chronic Kidney Disease <60  Kidney Failure <15      CBC Auto Differential [[id number]]  (Normal) Collected: 11/08/23 1400    Specimen: Blood Updated: 11/08/23 1407     WBC 7.01 10*3/mm3      RBC 4.89 10*6/mm3      Hemoglobin 13.8 g/dL      Hematocrit 43.3 %      MCV 88.5 fL      MCH 28.2 pg      MCHC 31.9 g/dL      RDW 13.7 %      RDW-SD 44.7 fl      MPV 10.6 fL      Platelets 206 10*3/mm3      Neutrophil % 57.3 %      Lymphocyte % 28.0 %      Monocyte % 8.1 %      Eosinophil % 5.4 %      Basophil % 0.9 %      Immature Grans % 0.3 %      Neutrophils, Absolute 4.02 10*3/mm3      Lymphocytes, Absolute 1.96 10*3/mm3      Monocytes, Absolute 0.57 10*3/mm3      Eosinophils, Absolute 0.38 10*3/mm3      Basophils, Absolute 0.06 10*3/mm3      Immature Grans, Absolute 0.02 10*3/mm3      nRBC 0.0 /100 WBC     RPR [[id number]] Collected: 11/08/23 1400    Specimen: Blood Updated: 11/08/23 1724             No radiology results from the last 24 hrs       MDM  Patient was evaluated in the ER for ocular syphilis that had already been diagnosed by retinal specialist who advised her to go to  or [facility].  She is hemodynamically stable, afebrile, nontoxic-appearing on exam.   Reviewed papers from the retinal specialist visit and patient had 20/100 vision in the left eye, the affected eye.  Normal pressures.  She was diagnosed with posterior uveitis and advised to go to somewhere with an ophthalmologist to receive IV antibiotics.  Advised patient that we do not have an ophthalmologist or infectious disease specialist available here.  Spoke with  as well as the hospitalist here as reported above.  Patient did not wish for me to speak to you available and ended up leaving AGAINST MEDICAL ADVICE as discussed above under ED course.    Final diagnoses:   Ocular syphilis          Fallon Oh, GIULIANO  11/08/23 1916

## 2023-11-08 NOTE — ED NOTES
Contacted [facility] per Fallon NOBLES request. Advised [facility] that patient refused treatment at [facility] and would like to be wait listed with [facility]. [facility] advised they would not wait list patient at this time due to acuity

## 2023-11-09 LAB
RPR SER QL: REACTIVE
RPR SER-TITR: ABNORMAL {TITER}

## 2024-01-26 ENCOUNTER — APPOINTMENT (OUTPATIENT)
Dept: GENERAL RADIOLOGY | Facility: HOSPITAL | Age: 34
DRG: 871 | End: 2024-01-26
Payer: MEDICAID

## 2024-01-26 ENCOUNTER — HOSPITAL ENCOUNTER (INPATIENT)
Facility: HOSPITAL | Age: 34
LOS: 1 days | Discharge: HOME | DRG: 871 | End: 2024-01-27
Attending: EMERGENCY MEDICINE | Admitting: FAMILY MEDICINE
Payer: MEDICAID

## 2024-01-26 DIAGNOSIS — J45.901 EXACERBATION OF ASTHMA, UNSPECIFIED ASTHMA SEVERITY, UNSPECIFIED WHETHER PERSISTENT: Primary | ICD-10-CM

## 2024-01-26 DIAGNOSIS — J96.92 RESPIRATORY FAILURE WITH HYPOXIA AND HYPERCAPNIA, UNSPECIFIED CHRONICITY: ICD-10-CM

## 2024-01-26 DIAGNOSIS — J96.91 RESPIRATORY FAILURE WITH HYPOXIA AND HYPERCAPNIA, UNSPECIFIED CHRONICITY: ICD-10-CM

## 2024-01-26 LAB
ALBUMIN SERPL-MCNC: 4.1 G/DL (ref 3.5–5.2)
ALBUMIN/GLOB SERPL: 1.2 G/DL
ALP SERPL-CCNC: 112 U/L (ref 39–117)
ALT SERPL W P-5'-P-CCNC: 23 U/L (ref 1–33)
AMPHET+METHAMPHET UR QL: POSITIVE
AMPHETAMINES UR QL: POSITIVE
ANION GAP SERPL CALCULATED.3IONS-SCNC: 13.7 MMOL/L (ref 5–15)
AST SERPL-CCNC: 33 U/L (ref 1–32)
ATMOSPHERIC PRESS: 735 MMHG
BARBITURATES UR QL SCN: NEGATIVE
BASE EXCESS BLDV CALC-SCNC: 3.4 MMOL/L (ref 0–2)
BASOPHILS # BLD AUTO: 0.06 10*3/MM3 (ref 0–0.2)
BASOPHILS NFR BLD AUTO: 0.6 % (ref 0–1.5)
BDY SITE: ABNORMAL
BENZODIAZ UR QL SCN: NEGATIVE
BILIRUB SERPL-MCNC: 0.3 MG/DL (ref 0–1.2)
BUN SERPL-MCNC: 11 MG/DL (ref 6–20)
BUN/CREAT SERPL: 11.7 (ref 7–25)
BUPRENORPHINE SERPL-MCNC: NEGATIVE NG/ML
CALCIUM SPEC-SCNC: 8.8 MG/DL (ref 8.6–10.5)
CANNABINOIDS SERPL QL: NEGATIVE
CHLORIDE SERPL-SCNC: 98 MMOL/L (ref 98–107)
CO2 SERPL-SCNC: 25.3 MMOL/L (ref 22–29)
COCAINE UR QL: NEGATIVE
COHGB MFR BLD: 1.5 % (ref 0–5)
CREAT SERPL-MCNC: 0.94 MG/DL (ref 0.57–1)
D DIMER PPP FEU-MCNC: 0.37 MCGFEU/ML (ref 0–0.5)
D-LACTATE SERPL-SCNC: 2.2 MMOL/L (ref 0.5–2)
D-LACTATE SERPL-SCNC: 3.6 MMOL/L (ref 0.5–2)
D-LACTATE SERPL-SCNC: 3.9 MMOL/L (ref 0.5–2)
D-LACTATE SERPL-SCNC: 5.1 MMOL/L (ref 0.5–2)
DEPRECATED RDW RBC AUTO: 42.4 FL (ref 37–54)
EGFRCR SERPLBLD CKD-EPI 2021: 82.3 ML/MIN/1.73
EOSINOPHIL # BLD AUTO: 0.46 10*3/MM3 (ref 0–0.4)
EOSINOPHIL NFR BLD AUTO: 4.6 % (ref 0.3–6.2)
ERYTHROCYTE [DISTWIDTH] IN BLOOD BY AUTOMATED COUNT: 13.1 % (ref 12.3–15.4)
FENTANYL UR-MCNC: POSITIVE NG/ML
FLUAV SUBTYP SPEC NAA+PROBE: NOT DETECTED
FLUBV RNA ISLT QL NAA+PROBE: NOT DETECTED
GAS FLOW AIRWAY: 5 LPM
GEN 5 2HR TROPONIN T REFLEX: 9 NG/L
GLOBULIN UR ELPH-MCNC: 3.5 GM/DL
GLUCOSE SERPL-MCNC: 159 MG/DL (ref 65–99)
HCO3 BLDV-SCNC: 32.4 MMOL/L (ref 22–28)
HCT VFR BLD AUTO: 46.9 % (ref 34–46.6)
HGB BLD-MCNC: 15.4 G/DL (ref 12–15.9)
IMM GRANULOCYTES # BLD AUTO: 0.02 10*3/MM3 (ref 0–0.05)
IMM GRANULOCYTES NFR BLD AUTO: 0.2 % (ref 0–0.5)
LYMPHOCYTES # BLD AUTO: 2.71 10*3/MM3 (ref 0.7–3.1)
LYMPHOCYTES NFR BLD AUTO: 27.3 % (ref 19.6–45.3)
Lab: ABNORMAL
MAGNESIUM SERPL-MCNC: 2.2 MG/DL (ref 1.6–2.6)
MCH RBC QN AUTO: 28.9 PG (ref 26.6–33)
MCHC RBC AUTO-ENTMCNC: 32.8 G/DL (ref 31.5–35.7)
MCV RBC AUTO: 88.2 FL (ref 79–97)
METHADONE UR QL SCN: POSITIVE
METHGB BLD QL: 0.4 % (ref 0–3)
MODALITY: ABNORMAL
MONOCYTES # BLD AUTO: 0.51 10*3/MM3 (ref 0.1–0.9)
MONOCYTES NFR BLD AUTO: 5.1 % (ref 5–12)
NEUTROPHILS NFR BLD AUTO: 6.17 10*3/MM3 (ref 1.7–7)
NEUTROPHILS NFR BLD AUTO: 62.2 % (ref 42.7–76)
NRBC BLD AUTO-RTO: 0 /100 WBC (ref 0–0.2)
OPIATES UR QL: NEGATIVE
OXYCODONE UR QL SCN: NEGATIVE
OXYHGB MFR BLDV: 66 % (ref 40–70)
PCO2 BLDV: 68.3 MM HG (ref 40–50)
PCP UR QL SCN: NEGATIVE
PH BLDV: 7.29 PH UNITS (ref 7.32–7.42)
PLATELET # BLD AUTO: 320 10*3/MM3 (ref 140–450)
PMV BLD AUTO: 10.6 FL (ref 6–12)
PO2 BLDV: 39.5 MM HG (ref 30–50)
POTASSIUM SERPL-SCNC: 4.8 MMOL/L (ref 3.5–5.2)
PROCALCITONIN SERPL-MCNC: 0.08 NG/ML (ref 0–0.25)
PROT SERPL-MCNC: 7.6 G/DL (ref 6–8.5)
RBC # BLD AUTO: 5.32 10*6/MM3 (ref 3.77–5.28)
SAO2 % BLDCOV: 67.2 % (ref 45–75)
SARS-COV-2 RNA RESP QL NAA+PROBE: NOT DETECTED
SODIUM SERPL-SCNC: 137 MMOL/L (ref 136–145)
TRICYCLICS UR QL SCN: NEGATIVE
TROPONIN T DELTA: NORMAL
TROPONIN T SERPL HS-MCNC: <6 NG/L
VENTILATOR MODE: ABNORMAL
WBC NRBC COR # BLD AUTO: 9.93 10*3/MM3 (ref 3.4–10.8)

## 2024-01-26 PROCEDURE — 94761 N-INVAS EAR/PLS OXIMETRY MLT: CPT

## 2024-01-26 PROCEDURE — 83605 ASSAY OF LACTIC ACID: CPT | Performed by: EMERGENCY MEDICINE

## 2024-01-26 PROCEDURE — 87636 SARSCOV2 & INF A&B AMP PRB: CPT | Performed by: EMERGENCY MEDICINE

## 2024-01-26 PROCEDURE — 25010000002 MAGNESIUM SULFATE 2 GM/50ML SOLUTION: Performed by: EMERGENCY MEDICINE

## 2024-01-26 PROCEDURE — 94640 AIRWAY INHALATION TREATMENT: CPT

## 2024-01-26 PROCEDURE — 94799 UNLISTED PULMONARY SVC/PX: CPT

## 2024-01-26 PROCEDURE — 25010000002 KETOROLAC TROMETHAMINE PER 15 MG: Performed by: EMERGENCY MEDICINE

## 2024-01-26 PROCEDURE — 25810000003 SODIUM CHLORIDE 0.9 % SOLUTION: Performed by: NURSE PRACTITIONER

## 2024-01-26 PROCEDURE — 25810000003 SODIUM CHLORIDE 0.9 % SOLUTION: Performed by: EMERGENCY MEDICINE

## 2024-01-26 PROCEDURE — 84145 PROCALCITONIN (PCT): CPT | Performed by: EMERGENCY MEDICINE

## 2024-01-26 PROCEDURE — 99285 EMERGENCY DEPT VISIT HI MDM: CPT

## 2024-01-26 PROCEDURE — 85379 FIBRIN DEGRADATION QUANT: CPT | Performed by: EMERGENCY MEDICINE

## 2024-01-26 PROCEDURE — 25010000002 METHYLPREDNISOLONE PER 125 MG: Performed by: EMERGENCY MEDICINE

## 2024-01-26 PROCEDURE — 36415 COLL VENOUS BLD VENIPUNCTURE: CPT

## 2024-01-26 PROCEDURE — 71045 X-RAY EXAM CHEST 1 VIEW: CPT

## 2024-01-26 PROCEDURE — 25010000002 METHYLPREDNISOLONE PER 125 MG: Performed by: NURSE PRACTITIONER

## 2024-01-26 PROCEDURE — 93005 ELECTROCARDIOGRAM TRACING: CPT | Performed by: EMERGENCY MEDICINE

## 2024-01-26 PROCEDURE — 85025 COMPLETE CBC W/AUTO DIFF WBC: CPT | Performed by: EMERGENCY MEDICINE

## 2024-01-26 PROCEDURE — 80053 COMPREHEN METABOLIC PANEL: CPT | Performed by: EMERGENCY MEDICINE

## 2024-01-26 PROCEDURE — 25010000002 ENOXAPARIN PER 10 MG: Performed by: NURSE PRACTITIONER

## 2024-01-26 PROCEDURE — 84484 ASSAY OF TROPONIN QUANT: CPT | Performed by: EMERGENCY MEDICINE

## 2024-01-26 PROCEDURE — 80307 DRUG TEST PRSMV CHEM ANLYZR: CPT | Performed by: NURSE PRACTITIONER

## 2024-01-26 PROCEDURE — 99222 1ST HOSP IP/OBS MODERATE 55: CPT | Performed by: NURSE PRACTITIONER

## 2024-01-26 PROCEDURE — 82820 HEMOGLOBIN-OXYGEN AFFINITY: CPT

## 2024-01-26 PROCEDURE — 83735 ASSAY OF MAGNESIUM: CPT | Performed by: EMERGENCY MEDICINE

## 2024-01-26 PROCEDURE — 82805 BLOOD GASES W/O2 SATURATION: CPT

## 2024-01-26 RX ORDER — METHYLPREDNISOLONE SODIUM SUCCINATE 125 MG/2ML
125 INJECTION, POWDER, LYOPHILIZED, FOR SOLUTION INTRAMUSCULAR; INTRAVENOUS ONCE
Status: COMPLETED | OUTPATIENT
Start: 2024-01-26 | End: 2024-01-26

## 2024-01-26 RX ORDER — BISACODYL 10 MG
10 SUPPOSITORY, RECTAL RECTAL DAILY PRN
Status: DISCONTINUED | OUTPATIENT
Start: 2024-01-26 | End: 2024-01-27 | Stop reason: HOSPADM

## 2024-01-26 RX ORDER — IPRATROPIUM BROMIDE AND ALBUTEROL SULFATE 2.5; .5 MG/3ML; MG/3ML
3 SOLUTION RESPIRATORY (INHALATION) ONCE
Status: COMPLETED | OUTPATIENT
Start: 2024-01-26 | End: 2024-01-26

## 2024-01-26 RX ORDER — METHADONE HYDROCHLORIDE 10 MG/1
100 TABLET ORAL DAILY
Status: DISCONTINUED | OUTPATIENT
Start: 2024-01-26 | End: 2024-01-27 | Stop reason: HOSPADM

## 2024-01-26 RX ORDER — ONDANSETRON 4 MG/1
4 TABLET, ORALLY DISINTEGRATING ORAL EVERY 6 HOURS PRN
Status: DISCONTINUED | OUTPATIENT
Start: 2024-01-26 | End: 2024-01-26

## 2024-01-26 RX ORDER — ENOXAPARIN SODIUM 100 MG/ML
40 INJECTION SUBCUTANEOUS DAILY
Status: DISCONTINUED | OUTPATIENT
Start: 2024-01-26 | End: 2024-01-27 | Stop reason: HOSPADM

## 2024-01-26 RX ORDER — POLYETHYLENE GLYCOL 3350 17 G/17G
17 POWDER, FOR SOLUTION ORAL DAILY PRN
Status: DISCONTINUED | OUTPATIENT
Start: 2024-01-26 | End: 2024-01-27 | Stop reason: HOSPADM

## 2024-01-26 RX ORDER — ONDANSETRON 2 MG/ML
4 INJECTION INTRAMUSCULAR; INTRAVENOUS EVERY 6 HOURS PRN
Status: DISCONTINUED | OUTPATIENT
Start: 2024-01-26 | End: 2024-01-26

## 2024-01-26 RX ORDER — IPRATROPIUM BROMIDE AND ALBUTEROL SULFATE 2.5; .5 MG/3ML; MG/3ML
3 SOLUTION RESPIRATORY (INHALATION)
Status: DISCONTINUED | OUTPATIENT
Start: 2024-01-26 | End: 2024-01-26

## 2024-01-26 RX ORDER — METHADONE HYDROCHLORIDE 10 MG/ML
100 CONCENTRATE ORAL DAILY
COMMUNITY

## 2024-01-26 RX ORDER — AMOXICILLIN 250 MG
2 CAPSULE ORAL 2 TIMES DAILY
Status: DISCONTINUED | OUTPATIENT
Start: 2024-01-26 | End: 2024-01-27 | Stop reason: HOSPADM

## 2024-01-26 RX ORDER — ACETAMINOPHEN 325 MG/1
650 TABLET ORAL EVERY 4 HOURS PRN
Status: DISCONTINUED | OUTPATIENT
Start: 2024-01-26 | End: 2024-01-27 | Stop reason: HOSPADM

## 2024-01-26 RX ORDER — SODIUM CHLORIDE 0.9 % (FLUSH) 0.9 %
10 SYRINGE (ML) INJECTION AS NEEDED
Status: DISCONTINUED | OUTPATIENT
Start: 2024-01-26 | End: 2024-01-27 | Stop reason: HOSPADM

## 2024-01-26 RX ORDER — BISACODYL 5 MG/1
5 TABLET, DELAYED RELEASE ORAL DAILY PRN
Status: DISCONTINUED | OUTPATIENT
Start: 2024-01-26 | End: 2024-01-27 | Stop reason: HOSPADM

## 2024-01-26 RX ORDER — NITROGLYCERIN 0.4 MG/1
0.4 TABLET SUBLINGUAL
Status: DISCONTINUED | OUTPATIENT
Start: 2024-01-26 | End: 2024-01-27 | Stop reason: HOSPADM

## 2024-01-26 RX ORDER — SODIUM CHLORIDE 0.9 % (FLUSH) 0.9 %
10 SYRINGE (ML) INJECTION EVERY 12 HOURS SCHEDULED
Status: DISCONTINUED | OUTPATIENT
Start: 2024-01-26 | End: 2024-01-27 | Stop reason: HOSPADM

## 2024-01-26 RX ORDER — CHOLECALCIFEROL (VITAMIN D3) 125 MCG
5 CAPSULE ORAL NIGHTLY PRN
Status: DISCONTINUED | OUTPATIENT
Start: 2024-01-26 | End: 2024-01-27 | Stop reason: HOSPADM

## 2024-01-26 RX ORDER — BUDESONIDE AND FORMOTEROL FUMARATE DIHYDRATE 160; 4.5 UG/1; UG/1
2 AEROSOL RESPIRATORY (INHALATION)
Status: DISCONTINUED | OUTPATIENT
Start: 2024-01-26 | End: 2024-01-27 | Stop reason: HOSPADM

## 2024-01-26 RX ORDER — SODIUM CHLORIDE 9 MG/ML
40 INJECTION, SOLUTION INTRAVENOUS AS NEEDED
Status: DISCONTINUED | OUTPATIENT
Start: 2024-01-26 | End: 2024-01-27 | Stop reason: HOSPADM

## 2024-01-26 RX ORDER — METHYLPREDNISOLONE SODIUM SUCCINATE 125 MG/2ML
60 INJECTION, POWDER, LYOPHILIZED, FOR SOLUTION INTRAMUSCULAR; INTRAVENOUS EVERY 12 HOURS
Status: COMPLETED | OUTPATIENT
Start: 2024-01-26 | End: 2024-01-27

## 2024-01-26 RX ORDER — IPRATROPIUM BROMIDE AND ALBUTEROL SULFATE 2.5; .5 MG/3ML; MG/3ML
3 SOLUTION RESPIRATORY (INHALATION)
Status: DISCONTINUED | OUTPATIENT
Start: 2024-01-26 | End: 2024-01-27 | Stop reason: HOSPADM

## 2024-01-26 RX ORDER — KETOROLAC TROMETHAMINE 30 MG/ML
10 INJECTION, SOLUTION INTRAMUSCULAR; INTRAVENOUS ONCE
Status: COMPLETED | OUTPATIENT
Start: 2024-01-26 | End: 2024-01-26

## 2024-01-26 RX ORDER — MAGNESIUM SULFATE HEPTAHYDRATE 40 MG/ML
2 INJECTION, SOLUTION INTRAVENOUS ONCE
Status: COMPLETED | OUTPATIENT
Start: 2024-01-26 | End: 2024-01-26

## 2024-01-26 RX ADMIN — MAGNESIUM SULFATE HEPTAHYDRATE 2 G: 40 INJECTION, SOLUTION INTRAVENOUS at 05:35

## 2024-01-26 RX ADMIN — Medication 10 ML: at 20:21

## 2024-01-26 RX ADMIN — METHADONE HYDROCHLORIDE 100 MG: 10 TABLET ORAL at 11:22

## 2024-01-26 RX ADMIN — IPRATROPIUM BROMIDE AND ALBUTEROL SULFATE 3 ML: .5; 3 SOLUTION RESPIRATORY (INHALATION) at 05:42

## 2024-01-26 RX ADMIN — METHYLPREDNISOLONE SODIUM SUCCINATE 125 MG: 125 INJECTION, POWDER, FOR SOLUTION INTRAMUSCULAR; INTRAVENOUS at 05:35

## 2024-01-26 RX ADMIN — METHYLPREDNISOLONE SODIUM SUCCINATE 60 MG: 125 INJECTION, POWDER, FOR SOLUTION INTRAMUSCULAR; INTRAVENOUS at 14:57

## 2024-01-26 RX ADMIN — SODIUM CHLORIDE 500 ML: 9 INJECTION, SOLUTION INTRAVENOUS at 12:58

## 2024-01-26 RX ADMIN — Medication 10 ML: at 14:58

## 2024-01-26 RX ADMIN — IPRATROPIUM BROMIDE AND ALBUTEROL SULFATE 3 ML: .5; 3 SOLUTION RESPIRATORY (INHALATION) at 19:19

## 2024-01-26 RX ADMIN — ENOXAPARIN SODIUM 40 MG: 100 INJECTION SUBCUTANEOUS at 07:45

## 2024-01-26 RX ADMIN — IPRATROPIUM BROMIDE AND ALBUTEROL SULFATE 3 ML: .5; 3 SOLUTION RESPIRATORY (INHALATION) at 23:01

## 2024-01-26 RX ADMIN — ACETAMINOPHEN 650 MG: 325 TABLET, FILM COATED ORAL at 07:45

## 2024-01-26 RX ADMIN — IPRATROPIUM BROMIDE AND ALBUTEROL SULFATE 3 ML: .5; 3 SOLUTION RESPIRATORY (INHALATION) at 08:17

## 2024-01-26 RX ADMIN — Medication 10 ML: at 08:10

## 2024-01-26 RX ADMIN — IPRATROPIUM BROMIDE AND ALBUTEROL SULFATE 3 ML: .5; 3 SOLUTION RESPIRATORY (INHALATION) at 06:30

## 2024-01-26 RX ADMIN — BUDESONIDE AND FORMOTEROL FUMARATE DIHYDRATE 2 PUFF: 160; 4.5 AEROSOL RESPIRATORY (INHALATION) at 09:18

## 2024-01-26 RX ADMIN — IPRATROPIUM BROMIDE AND ALBUTEROL SULFATE 3 ML: .5; 3 SOLUTION RESPIRATORY (INHALATION) at 11:16

## 2024-01-26 RX ADMIN — BUDESONIDE AND FORMOTEROL FUMARATE DIHYDRATE 2 PUFF: 160; 4.5 AEROSOL RESPIRATORY (INHALATION) at 23:01

## 2024-01-26 RX ADMIN — KETOROLAC TROMETHAMINE 10 MG: 30 INJECTION, SOLUTION INTRAMUSCULAR; INTRAVENOUS at 06:42

## 2024-01-26 RX ADMIN — SODIUM CHLORIDE 1000 ML: 9 INJECTION, SOLUTION INTRAVENOUS at 05:40

## 2024-01-26 NOTE — ED PROVIDER NOTES
TRIAGE CHIEF COMPLAINT:     Nursing and triage notes reviewed    Chief Complaint   Patient presents with    Shortness of Breath      HPI: Charity Luna is a 34 y.o. female who presents to the emergency department complaining of shortness of breath.  Patient describes a several day history of worsening shortness of breath, nonproductive cough.  Patient states she has a history of asthma.  She has had no fever or chest discomfort.  She states symptoms acutely worsened this morning.    REVIEW OF SYSTEMS: All other systems reviewed and are negative     PAST MEDICAL HISTORY:   Past Medical History:   Diagnosis Date    Asthma     Cholelithiasis         FAMILY HISTORY:   Family History   Problem Relation Age of Onset    Diabetes Mother     Heart failure Mother     Asthma Mother     COPD Mother     Heart failure Father     Hypertension Father     Hyperlipidemia Father     No Known Problems Sister     No Known Problems Brother         SOCIAL HISTORY:   Social History     Socioeconomic History    Marital status: Single   Tobacco Use    Smoking status: Every Day     Packs/day: .5     Types: Cigarettes    Smokeless tobacco: Never   Substance and Sexual Activity    Alcohol use: No    Drug use: No    Sexual activity: Defer     Partners: Male        SURGICAL HISTORY:   Past Surgical History:   Procedure Laterality Date    ADENOIDECTOMY      CHOLECYSTECTOMY      EAR TUBES      TONSILLECTOMY          CURRENT MEDICATIONS:      Medication List        ASK your doctor about these medications      albuterol sulfate  (90 Base) MCG/ACT inhaler  Commonly known as: PROVENTIL HFA;VENTOLIN HFA;PROAIR HFA  Inhale 2 puffs Every 4 (Four) Hours As Needed for Wheezing.     budesonide-formoterol 160-4.5 MCG/ACT inhaler  Commonly known as: SYMBICORT  Inhale 2 puffs 2 (Two) Times a Day.     levoFLOXacin 500 MG tablet  Commonly known as: LEVAQUIN  Take 1 tablet by mouth Daily.     predniSONE 10 MG tablet  Commonly known as:  DELTASONE  Take 4 tabs daily x 3 days, then take 3 tabs daily x 3 days, then take 2 tabs daily x 3 days, then take 1 tab daily x 3 days               ALLERGIES: Ceftin [cefuroxime], Codeine, and Sulfamethoxazole-trimethoprim     PHYSICAL EXAM:   VITAL SIGNS: There were no vitals filed for this visit.   CONSTITUTIONAL: Awake, oriented, appears dyspneic  HENT: Atraumatic, normocephalic, oral mucosa pink and moist, airway patent. Nares patent without drainage. External ears normal.   EYES: Conjunctivae clear   NECK: Trachea midline   CARDIOVASCULAR: Tachycardic with a regular rhythm, No murmurs, rubs, gallops   PULMONARY/CHEST: Increased work of breathing, mild intercostal retractions.  Diffuse wheezes throughout.  Diminished breath sounds.  ABDOMINAL: Nondistended, soft, nontender - no rebound or guarding.  NEUROLOGIC: Nonfocal, moving all four extremities, no gross sensory or motor deficits.   EXTREMITIES: No clubbing, cyanosis, or edema   SKIN: Warm, Dry, No erythema, No rash     ED COURSE / MEDICAL DECISION MAKING:   Charity Luna is a 34 y.o. female who presents to the emergency department for evaluation of respiratory distress.  Patient has an oxygen saturation in the 70s on room air on arrival.  Patient has increased work of breathing and diminished breath sounds with wheezes in all lung fields.    Differential diagnosis includes asthma exacerbation, pneumothorax, pneumonia, viral illness among other etiologies.    Chest x-ray, EKG, CBC, CMP, cardiac enzymes was ordered for further evaluation of the patient's presentation.    Diagnostic information from other sources: Family, chart review    Interventions: IV fluids, DuoNeb, Solu-Medrol, magnesium.    EKG interpreted by me reveals sinus tachycardia with a rate of 108 bpm.  There is minimal ST depression.  This is an abnormal appearing EKG.    Narrative: Patient presents with shortness of breath and respiratory distress.  Chest x-ray per my interpretation  reveals chronic findings but no obvious acute process.  White blood cell count, cardiac enzymes, procalcitonin, D-dimer, electrolytes are all within the normal range.  COVID and influenza screens are negative.  Venous blood gas testing does reveal mildly elevated CO2 and mild acidosis.  The acidosis is within the normal range for a venous blood gas.  I have treated the patient with IV fluids, DuoNeb, Solu-Medrol, magnesium.  Patient still requiring supplemental oxygen after these therapies.  Will require admission to the hospital.      DECISION TO DISCHARGE/ADMIT: see ED care timeline     FINAL IMPRESSION:   1 --asthma exacerbation  2 --respiratory failure with hypoxia and hypercapnia  3 --     Electronically signed by: Frances Abraham MD, 1/26/2024 05:21 Frances Acuna MD  01/26/24 0717    Dr. Townsend hospitalist, will admit       Ramesh Jaramillo DO  01/26/24 0759

## 2024-01-26 NOTE — CASE MANAGEMENT/SOCIAL WORK
Discharge Planning Assessment  [facility]     Patient Name: Charity Luna  MRN: [id number]  Today's Date: 1/26/2024    Admit Date: 1/26/2024    Plan: The patient is awake and able to answer questions.  She is a current patient of [facility] and gets her medications from [facility].  She elects to participate with Meds to Bed.  She uses a nebulizer at home.  She denies the need for additional DME or services at time of DC.  She is unemployed at this time and tells me she is trying to qualify for disability.  At the time of DC she plans to return to the [facility] where she lives alone.  All questions and concerns were addressed at the time of this conversation.  Will provide additional resources and information upon patient request.   Discharge Needs Assessment       Row Name 01/26/24 1145       Living Environment    People in Home alone    Current Living Arrangements other (see comments)  [facility]    Duration at Residence 3 months    Potentially Unsafe Housing Conditions none    In the past 12 months has the electric, gas, oil, or water company threatened to shut off services in your home? No    Primary Care Provided by self    Provides Primary Care For no one    Family Caregiver if Needed none    Able to Return to Prior Arrangements yes       Resource/Environmental Concerns    Resource/Environmental Concerns none       Transportation Needs    In the past 12 months, has lack of transportation kept you from medical appointments or from getting medications? no    In the past 12 months, has lack of transportation kept you from meetings, work, or from getting things needed for daily living? No       Food Insecurity    Within the past 12 months, you worried that your food would run out before you got the money to buy more. Never true    Within the past 12 months, the food you bought just didn't last and you didn't have money to get more. Never true       Transition Planning    Patient/Family Anticipates Transition  to home    Patient/Family Anticipated Services at Transition none       Discharge Needs Assessment    Readmission Within the Last 30 Days no previous admission in last 30 days    Equipment Currently Used at Home nebulizer    Concerns to be Addressed denies needs/concerns at this time    Anticipated Changes Related to Illness none    Equipment Needed After Discharge none                   Discharge Plan       Row Name 01/26/24 1146       Plan    Plan The patient is awake and able to answer questions.  She is a current patient of [facility] and gets her medications from [facility].  She elects to participate with Meds to Bed.  She uses a nebulizer at home.  She denies the need for additional DME or services at time of DC.  She is unemployed at this time and tells me she is trying to qualify for disability.  At the time of DC she plans to return to the [facility] where she lives alone.  All questions and concerns were addressed at the time of this conversation.  Will provide additional resources and information upon patient request.    Final Discharge Disposition Code 01 - home or self-care                  Continued Care and Services - Admitted Since 1/26/2024    Coordination has not been started for this encounter.          Demographic Summary       Row Name 01/26/24 1143       General Information    Admission Type inpatient    Arrived From emergency department    Referral Source admission list    Reason for Consult discharge planning    Preferred Language English       Contact Information    Permission Granted to Share Info With                    Functional Status       Row Name 01/26/24 1144       Functional Status    Usual Activity Tolerance good    Current Activity Tolerance fair       Physical Activity    On average, how many days per week do you engage in moderate to strenuous exercise (like a brisk walk)? 0 days    On average, how many minutes do you engage in exercise at this level? 0 min     Number of minutes of exercise per week 0       Assessment of Health Literacy    How often do you have someone help you read hospital materials? Never    How often do you have problems learning about your medical condition because of difficulty understanding written information? Never    How often do you have a problem understanding what is told to you about your medical condition? Never    How confident are you filling out medical forms by yourself? Extremely    Health Literacy Excellent       Functional Status, IADL    Medications independent    Meal Preparation independent    Housekeeping independent    Laundry independent    Shopping independent       Mental Status    General Appearance WDL X;appearance    General Appearance unkempt       Mental Status Summary    Recent Changes in Mental Status/Cognitive Functioning no changes       Employment/    Employment Status unemployed    Current or Previous Occupation not applicable                   Psychosocial       Row Name 01/26/24 1144       Values/Beliefs    Spiritual, Cultural Beliefs, Hindu Practices, Values that Affect Care no       Behavior WDL    Behavior WDL WDL       Emotion Mood WDL    Emotion/Mood/Affect WDL WDL       Mental Health    Little Interest or Pleasure in Doing Things 0-->not at all    Feeling Down, Depressed or Hopeless 0-->not at all       Speech WDL    Speech WDL WDL       Perceptual State WDL    Perceptual State WDL WDL       Thought Process WDL    Thought Process WDL WDL       Intellectual Performance WDL    Intellectual Performance WDL WDL                   Abuse/Neglect       Row Name 01/26/24 1144       Personal Safety    Feels Unsafe at Home or Work/School no    Feels Threatened by Someone no    Does Anyone Try to Keep You From Having Contact with Others or Doing Things Outside Your Home? no    Physical Signs of Abuse Present no                   Legal       Row Name 01/26/24 1144       Financial Resource Strain    How hard  is it for you to pay for the very basics like food, housing, medical care, and heating? Not hard                   Substance Abuse       Row Name 01/26/24 1144       Substance Use    Substance Use Status never used                   Patient Forms    No documentation.                     Gisela Ibanez RN

## 2024-01-26 NOTE — PLAN OF CARE
Goal Outcome Evaluation:         Asthma exacerbation care plan initiated.

## 2024-01-26 NOTE — H&P
[facility]      Name:  Charity Luna   Age:  34 y.o.  Sex:  female  :  1990  MRN:  [id number]   Visit Number:  [id number]  Admission Date:  2024  Date Of Service:  24  Primary Care Physician:  Amber Castillo APRN    Chief Complaint:     Shortness of air    History Of Presenting Illness:      Ms. Luna is a 34-year-old female with medical history pertinent for ocular syphilis, hepatitis C on Epclusa, asthma, and substance use disorder on methadone who presented to the emergency department secondary to shortness of air.  Onset of symptoms a couple days prior to presentation.  Denied associated chest pain or fever.  She does take daily Symbicort and albuterol.  Denies smoking tobacco however does vape.  No recent upper respiratory illness.    Upon ED presentation patient requiring 6 L nasal cannula and tachypneic with respiratory rate 36.  Pertinent labs and imaging included negative COVID and influenza swab, ABG with noted pH 7.2/pCO2 68.3/bicarb 32.4, lactic acid 2.2 with repeat 3.6, chest x-ray noted no acute cardiopulmonary process.  Patient was given several DuoNebs/magnesium sulfate/125 mg of methylprednisone/1 L fluid bolus in the emergency department.  Hospitalist consulted for further medical management.  Patient continued on IV Solu-Medrol given continued wheezing.  Fortunately patient oxygen requirements decreased to 2 L nasal cannula.  She was continued on methadone for her substance use disorder.    Review Of Systems:    All systems were reviewed and negative except as mentioned in history of presenting illness, assessment and plan.    Past Medical History: Patient  has a past medical history of Asthma, Cholelithiasis, and Hepatitis C.    Past Surgical History: Patient  has a past surgical history that includes Ear Tubes Removal; Tonsillectomy; Adenoidectomy; and Cholecystectomy.    Social History: Patient  reports that she  has been smoking cigarettes. She has been smoking an average of .5 packs per day. She has never used smokeless tobacco. She reports that she does not drink alcohol and does not use drugs.    Family History:  Patient's family history has been reviewed and found to be noncontributory.     Allergies:      Ceftin [cefuroxime], Codeine, and Sulfamethoxazole-trimethoprim    Home Medications:    Prior to Admission Medications       Prescriptions Last Dose Informant Patient Reported? Taking?    albuterol (PROVENTIL HFA;VENTOLIN HFA) 108 (90 Base) MCG/ACT inhaler 1/26/2024  No Yes    Inhale 2 puffs Every 4 (Four) Hours As Needed for Wheezing.    budesonide-formoterol (SYMBICORT) 160-4.5 MCG/ACT inhaler 1/26/2024  No Yes    Inhale 2 puffs 2 (Two) Times a Day.    methadone (DOLOPHINE) 10 MG/ML solution 1/25/2024  Yes Yes    Take 10 mL by mouth Daily.    levoFLOXacin (LEVAQUIN) 500 MG tablet Unknown  No No    Take 1 tablet by mouth Daily.    Patient not taking:  Reported on 11/8/2023    predniSONE (DELTASONE) 10 MG tablet Unknown  No No    Take 4 tabs daily x 3 days, then take 3 tabs daily x 3 days, then take 2 tabs daily x 3 days, then take 1 tab daily x 3 days    Patient not taking:  Reported on 11/8/2023          ED Medications:    Medications   ipratropium-albuterol (DUO-NEB) nebulizer solution 3 mL (3 mL Nebulization Given 1/26/24 0542)   methylPREDNISolone sodium succinate (SOLU-Medrol) injection 125 mg (125 mg Intravenous Given 1/26/24 0535)   magnesium sulfate 2g/50 mL (PREMIX) infusion (0 g Intravenous Stopped 1/26/24 0723)   sodium chloride 0.9 % bolus 1,000 mL (1,000 mL Intravenous New Bag 1/26/24 0540)   ipratropium-albuterol (DUO-NEB) nebulizer solution 3 mL (3 mL Nebulization Given 1/26/24 0630)   ipratropium-albuterol (DUO-NEB) nebulizer solution 3 mL (3 mL Nebulization Given 1/26/24 0630)   ketorolac (TORADOL) injection 10 mg (10 mg Intravenous Given 1/26/24 0642)     Vital Signs:  Temp:  [98.6 °F (37 °C)] 98.6  "°F (37 °C)  Heart Rate:  [] 101  Resp:  [18-36] 18  BP: ()/(65-93) 113/73        01/26/24  0521   Weight: 56.7 kg (125 lb)     Body mass index is 23.62 kg/m².    Physical Exam:     Most recent vital Signs: /73   Pulse 101   Temp 98.6 °F (37 °C) (Oral)   Resp 18   Ht 154.9 cm (61\")   Wt 56.7 kg (125 lb)   SpO2 90%   BMI 23.62 kg/m²     Physical Exam  Vitals and nursing note reviewed.   Constitutional:       Comments: Chronically ill-appearing 34-year-old female.   HENT:      Head: Normocephalic.      Nose: Nose normal.      Mouth/Throat:      Mouth: Mucous membranes are dry.   Eyes:      Pupils: Pupils are equal, round, and reactive to light.   Cardiovascular:      Rate and Rhythm: Tachycardia present.      Pulses: Normal pulses.      Heart sounds: Normal heart sounds.   Pulmonary:      Breath sounds: Wheezing present.      Comments: On 2 L nasal cannula unlabored.  Abdominal:      General: Bowel sounds are normal.      Palpations: Abdomen is soft.   Musculoskeletal:         General: Normal range of motion.      Cervical back: Normal range of motion.   Skin:     Capillary Refill: Capillary refill takes less than 2 seconds.      Comments: Scabs noted on face.   Neurological:      General: No focal deficit present.      Mental Status: She is alert and oriented to person, place, and time.   Psychiatric:         Mood and Affect: Mood normal.         Laboratory data:    I have reviewed the labs done in the emergency room.    Results from last 7 days   Lab Units 01/26/24  0526   SODIUM mmol/L 137   POTASSIUM mmol/L 4.8   CHLORIDE mmol/L 98   CO2 mmol/L 25.3   BUN mg/dL 11   CREATININE mg/dL 0.94   CALCIUM mg/dL 8.8   BILIRUBIN mg/dL 0.3   ALK PHOS U/L 112   ALT (SGPT) U/L 23   AST (SGOT) U/L 33*   GLUCOSE mg/dL 159*     Results from last 7 days   Lab Units 01/26/24  0526   WBC 10*3/mm3 9.93   HEMOGLOBIN g/dL 15.4   HEMATOCRIT % 46.9*   PLATELETS 10*3/mm3 320         Results from last 7 days   Lab " "Units 01/26/24  0526   HSTROP T ng/L <6                           Invalid input(s): \"USDES\", \"NITRITITE\", \"BACT\", \"EP\"    Pain Management Panel          Latest Ref Rng & Units 6/5/2017   Pain Management Panel   Amphetamine, Urine Qual Negative Negative    Barbiturates Screen, Urine Negative Negative    Benzodiazepine Screen, Urine Negative Negative    Buprenorphine, Screen, Urine Negative Positive    Cocaine Screen, Urine Negative Negative    Methadone Screen , Urine Negative Negative    Methamphetamine, Ur Negative Negative        EKG:      Sinus tachycardia bpm 108    Radiology:    No radiology results for the last 3 days    Assessment:    Acute persistent asthma exacerbation, POA  Lactic acidosis, POA  Substance use disorder on methadone  History of ocular syphilis  Hepatitis C on Epclusa    Plan:    -Continue supportive care with Symbicort/DuoNeb/IV Solu-Medrol/supplemental oxygen.  -Will continue to monitor lactate closely.  Patient appears to be improving.  -Continue home medications.  -Further orders pending clinical course.    Risk Assessment: High  DVT Prophylaxis: Lovenox  Code Status: Full code  Diet: Regular    Advance Care Planning   ACP discussion was declined by the patient. Patient does not have an advance directive, declines further assistance.           Brittany Parr, APRN  01/26/24  07:29 EST    Dictated utilizing Dragon dictation.  "

## 2024-01-26 NOTE — PAYOR COMM NOTE
"TO:HUMANA  FROM:FEDERICO LEHMAN, RN PHONE [phone]  IN CLINICALS REF# [id number]    Charity Luna (34 y.o. Female)       Date of Birth   1990    Social Security Number       Address   [address] [facility] [address]    Home Phone   [phone]    MRN   [id number]       Hinduism   None    Marital Status   Single                            Admission Date   24    Admission Type   Emergency    Admitting Provider   Chicho Palmer DO    Attending Provider   Chicho Palmer DO    Department, Room/Bed   [facility] TELEMETRY 3, 308/1       Discharge Date       Discharge Disposition       Discharge Destination                                 Attending Provider: Chicho Palmer DO    Allergies: Ceftin [Cefuroxime], Codeine, Sulfamethoxazole-trimethoprim    Isolation: None   Infection: None   Code Status: CPR    Ht: 152.4 cm (60\")   Wt: 57.4 kg (126 lb 8.7 oz)    Admission Cmt: None   Principal Problem: Asthma exacerbation [J45.901]                   Active Insurance as of 2024       Primary Coverage       Payor Plan Insurance Group Employer/Plan Group    [place] [id number]       Payor Plan Address Payor Plan Phone Number Payor Plan Fax Number Effective Dates    [employer] [address] [address] [phone]  2020 - None Entered    [facility] [address]         Subscriber Name Subscriber Birth Date Member ID       CHARITY LUNA 1990 [id number]                     Emergency Contacts        (Rel.) Home Phone Work Phone Mobile Phone    Trino Luna (Father) [phone] -- --                 History & Physical        Keshav, Brittany BERKOWITZ APRN at 24 0728            [facility] HOSPITALIST   HISTORY AND PHYSICAL      Name:  Charity Luna   Age:  34 y.o.  Sex:  female  :  1990  MRN:  [id number]   Visit Number:  [id number]  Admission Date:  2024  Date Of Service:  " 01/26/24  Primary Care Physician:  Amber Castillo APRN    Chief Complaint:     Shortness of air    History Of Presenting Illness:      Ms. Luna is a 34-year-old female with medical history pertinent for ocular syphilis, hepatitis C on Epclusa, asthma, and substance use disorder on methadone who presented to the emergency department secondary to shortness of air.  Onset of symptoms a couple days prior to presentation.  Denied associated chest pain or fever.  She does take daily Symbicort and albuterol.  Denies smoking tobacco however does vape.  No recent upper respiratory illness.    Upon ED presentation patient requiring 6 L nasal cannula and tachypneic with respiratory rate 36.  Pertinent labs and imaging included negative COVID and influenza swab, ABG with noted pH 7.2/pCO2 68.3/bicarb 32.4, lactic acid 2.2 with repeat 3.6, chest x-ray noted no acute cardiopulmonary process.  Patient was given several DuoNebs/magnesium sulfate/125 mg of methylprednisone/1 L fluid bolus in the emergency department.  Hospitalist consulted for further medical management.  Patient continued on IV Solu-Medrol given continued wheezing.  Fortunately patient oxygen requirements decreased to 2 L nasal cannula.  She was continued on methadone for her substance use disorder.    Review Of Systems:    All systems were reviewed and negative except as mentioned in history of presenting illness, assessment and plan.    Past Medical History: Patient  has a past medical history of Asthma, Cholelithiasis, and Hepatitis C.    Past Surgical History: Patient  has a past surgical history that includes Ear Tubes Removal; Tonsillectomy; Adenoidectomy; and Cholecystectomy.    Social History: Patient  reports that she has been smoking cigarettes. She has been smoking an average of .5 packs per day. She has never used smokeless tobacco. She reports that she does not drink alcohol and does not use drugs.    Family History:  Patient's family history  has been reviewed and found to be noncontributory.     Allergies:      Ceftin [cefuroxime], Codeine, and Sulfamethoxazole-trimethoprim    Home Medications:    Prior to Admission Medications       Prescriptions Last Dose Informant Patient Reported? Taking?    albuterol (PROVENTIL HFA;VENTOLIN HFA) 108 (90 Base) MCG/ACT inhaler 1/26/2024  No Yes    Inhale 2 puffs Every 4 (Four) Hours As Needed for Wheezing.    budesonide-formoterol (SYMBICORT) 160-4.5 MCG/ACT inhaler 1/26/2024  No Yes    Inhale 2 puffs 2 (Two) Times a Day.    methadone (DOLOPHINE) 10 MG/ML solution 1/25/2024  Yes Yes    Take 10 mL by mouth Daily.    levoFLOXacin (LEVAQUIN) 500 MG tablet Unknown  No No    Take 1 tablet by mouth Daily.    Patient not taking:  Reported on 11/8/2023    predniSONE (DELTASONE) 10 MG tablet Unknown  No No    Take 4 tabs daily x 3 days, then take 3 tabs daily x 3 days, then take 2 tabs daily x 3 days, then take 1 tab daily x 3 days    Patient not taking:  Reported on 11/8/2023          ED Medications:    Medications   ipratropium-albuterol (DUO-NEB) nebulizer solution 3 mL (3 mL Nebulization Given 1/26/24 0542)   methylPREDNISolone sodium succinate (SOLU-Medrol) injection 125 mg (125 mg Intravenous Given 1/26/24 0535)   magnesium sulfate 2g/50 mL (PREMIX) infusion (0 g Intravenous Stopped 1/26/24 0723)   sodium chloride 0.9 % bolus 1,000 mL (1,000 mL Intravenous New Bag 1/26/24 0540)   ipratropium-albuterol (DUO-NEB) nebulizer solution 3 mL (3 mL Nebulization Given 1/26/24 0630)   ipratropium-albuterol (DUO-NEB) nebulizer solution 3 mL (3 mL Nebulization Given 1/26/24 0630)   ketorolac (TORADOL) injection 10 mg (10 mg Intravenous Given 1/26/24 0642)     Vital Signs:  Temp:  [98.6 °F (37 °C)] 98.6 °F (37 °C)  Heart Rate:  [] 101  Resp:  [18-36] 18  BP: ()/(65-93) 113/73        01/26/24  0521   Weight: 56.7 kg (125 lb)     Body mass index is 23.62 kg/m².    Physical Exam:     Most recent vital Signs: /73    "Pulse 101   Temp 98.6 °F (37 °C) (Oral)   Resp 18   Ht 154.9 cm (61\")   Wt 56.7 kg (125 lb)   SpO2 90%   BMI 23.62 kg/m²     Physical Exam  Vitals and nursing note reviewed.   Constitutional:       Comments: Chronically ill-appearing 34-year-old female.   HENT:      Head: Normocephalic.      Nose: Nose normal.      Mouth/Throat:      Mouth: Mucous membranes are dry.   Eyes:      Pupils: Pupils are equal, round, and reactive to light.   Cardiovascular:      Rate and Rhythm: Tachycardia present.      Pulses: Normal pulses.      Heart sounds: Normal heart sounds.   Pulmonary:      Breath sounds: Wheezing present.      Comments: On 2 L nasal cannula unlabored.  Abdominal:      General: Bowel sounds are normal.      Palpations: Abdomen is soft.   Musculoskeletal:         General: Normal range of motion.      Cervical back: Normal range of motion.   Skin:     Capillary Refill: Capillary refill takes less than 2 seconds.      Comments: Scabs noted on face.   Neurological:      General: No focal deficit present.      Mental Status: She is alert and oriented to person, place, and time.   Psychiatric:         Mood and Affect: Mood normal.         Laboratory data:    I have reviewed the labs done in the emergency room.    Results from last 7 days   Lab Units 01/26/24  0526   SODIUM mmol/L 137   POTASSIUM mmol/L 4.8   CHLORIDE mmol/L 98   CO2 mmol/L 25.3   BUN mg/dL 11   CREATININE mg/dL 0.94   CALCIUM mg/dL 8.8   BILIRUBIN mg/dL 0.3   ALK PHOS U/L 112   ALT (SGPT) U/L 23   AST (SGOT) U/L 33*   GLUCOSE mg/dL 159*     Results from last 7 days   Lab Units 01/26/24  0526   WBC 10*3/mm3 9.93   HEMOGLOBIN g/dL 15.4   HEMATOCRIT % 46.9*   PLATELETS 10*3/mm3 320         Results from last 7 days   Lab Units 01/26/24  0526   HSTROP T ng/L <6                           Invalid input(s): \"USDES\", \"NITRITITE\", \"BACT\", \"EP\"    Pain Management Panel          Latest Ref Rng & Units 6/5/2017   Pain Management Panel   Amphetamine, Urine " Qual Negative Negative    Barbiturates Screen, Urine Negative Negative    Benzodiazepine Screen, Urine Negative Negative    Buprenorphine, Screen, Urine Negative Positive    Cocaine Screen, Urine Negative Negative    Methadone Screen , Urine Negative Negative    Methamphetamine, Ur Negative Negative        EKG:      Sinus tachycardia bpm 108    Radiology:    No radiology results for the last 3 days    Assessment:    Acute persistent asthma exacerbation, POA  Lactic acidosis, POA  Substance use disorder on methadone  History of ocular syphilis  Hepatitis C on Epclusa    Plan:    -Continue supportive care with Symbicort/DuoNeb/IV Solu-Medrol/supplemental oxygen.  -Will continue to monitor lactate closely.  Patient appears to be improving.  -Continue home medications.  -Further orders pending clinical course.    Risk Assessment: High  DVT Prophylaxis: Lovenox  Code Status: Full code  Diet: Regular    Advance Care Planning  ACP discussion was declined by the patient. Patient does not have an advance directive, declines further assistance.           JOY Ornelas  01/26/24  07:29 EST    Dictated utilizing Dragon dictation.    Electronically signed by Brittany Parr APRN at 01/26/24 1218          Emergency Department Notes        Acacia Traore RN at 01/26/24 1312          Report given to 3rd floor RN and orderly notified for transport.    Electronically signed by Acacia Traore RN at 01/26/24 1313       Janiya Singh RN at 01/26/24 0631          RT at bedside.     Electronically signed by Janiya Singh RN at 01/26/24 0631       Janiya Singh RN at 01/26/24 0550          Verified with pharmacist at [facility] in [place], [place] that patients dose of 100mg Liquid Methadone was last given yesterday (1/25/24 approx 11:30), Notified MD.     Electronically signed by Janiya Singh RN at 01/26/24 0605       Ramesh Jaramillo DO at 01/26/24 0521          TRIAGE CHIEF COMPLAINT:      Nursing and triage notes reviewed    Chief Complaint   Patient presents with    Shortness of Breath      HPI: Charity Luna is a 34 y.o. female who presents to the emergency department complaining of shortness of breath.  Patient describes a several day history of worsening shortness of breath, nonproductive cough.  Patient states she has a history of asthma.  She has had no fever or chest discomfort.  She states symptoms acutely worsened this morning.    REVIEW OF SYSTEMS: All other systems reviewed and are negative     PAST MEDICAL HISTORY:   Past Medical History:   Diagnosis Date    Asthma     Cholelithiasis         FAMILY HISTORY:   Family History   Problem Relation Age of Onset    Diabetes Mother     Heart failure Mother     Asthma Mother     COPD Mother     Heart failure Father     Hypertension Father     Hyperlipidemia Father     No Known Problems Sister     No Known Problems Brother         SOCIAL HISTORY:   Social History     Socioeconomic History    Marital status: Single   Tobacco Use    Smoking status: Every Day     Packs/day: .5     Types: Cigarettes    Smokeless tobacco: Never   Substance and Sexual Activity    Alcohol use: No    Drug use: No    Sexual activity: Defer     Partners: Male        SURGICAL HISTORY:   Past Surgical History:   Procedure Laterality Date    ADENOIDECTOMY      CHOLECYSTECTOMY      EAR TUBES      TONSILLECTOMY          CURRENT MEDICATIONS:      Medication List        ASK your doctor about these medications      albuterol sulfate  (90 Base) MCG/ACT inhaler  Commonly known as: PROVENTIL HFA;VENTOLIN HFA;PROAIR HFA  Inhale 2 puffs Every 4 (Four) Hours As Needed for Wheezing.     budesonide-formoterol 160-4.5 MCG/ACT inhaler  Commonly known as: SYMBICORT  Inhale 2 puffs 2 (Two) Times a Day.     levoFLOXacin 500 MG tablet  Commonly known as: LEVAQUIN  Take 1 tablet by mouth Daily.     predniSONE 10 MG tablet  Commonly known as: DELTASONE  Take 4 tabs daily x 3 days,  then take 3 tabs daily x 3 days, then take 2 tabs daily x 3 days, then take 1 tab daily x 3 days               ALLERGIES: Ceftin [cefuroxime], Codeine, and Sulfamethoxazole-trimethoprim     PHYSICAL EXAM:   VITAL SIGNS: There were no vitals filed for this visit.   CONSTITUTIONAL: Awake, oriented, appears dyspneic  HENT: Atraumatic, normocephalic, oral mucosa pink and moist, airway patent. Nares patent without drainage. External ears normal.   EYES: Conjunctivae clear   NECK: Trachea midline   CARDIOVASCULAR: Tachycardic with a regular rhythm, No murmurs, rubs, gallops   PULMONARY/CHEST: Increased work of breathing, mild intercostal retractions.  Diffuse wheezes throughout.  Diminished breath sounds.  ABDOMINAL: Nondistended, soft, nontender - no rebound or guarding.  NEUROLOGIC: Nonfocal, moving all four extremities, no gross sensory or motor deficits.   EXTREMITIES: No clubbing, cyanosis, or edema   SKIN: Warm, Dry, No erythema, No rash     ED COURSE / MEDICAL DECISION MAKING:   Charity Luna is a 34 y.o. female who presents to the emergency department for evaluation of respiratory distress.  Patient has an oxygen saturation in the 70s on room air on arrival.  Patient has increased work of breathing and diminished breath sounds with wheezes in all lung fields.    Differential diagnosis includes asthma exacerbation, pneumothorax, pneumonia, viral illness among other etiologies.    Chest x-ray, EKG, CBC, CMP, cardiac enzymes was ordered for further evaluation of the patient's presentation.    Diagnostic information from other sources: Family, chart review    Interventions: IV fluids, DuoNeb, Solu-Medrol, magnesium.    EKG interpreted by me reveals sinus tachycardia with a rate of 108 bpm.  There is minimal ST depression.  This is an abnormal appearing EKG.    Narrative: Patient presents with shortness of breath and respiratory distress.  Chest x-ray per my interpretation reveals chronic findings but no obvious  acute process.  White blood cell count, cardiac enzymes, procalcitonin, D-dimer, electrolytes are all within the normal range.  COVID and influenza screens are negative.  Venous blood gas testing does reveal mildly elevated CO2 and mild acidosis.  The acidosis is within the normal range for a venous blood gas.  I have treated the patient with IV fluids, DuoNeb, Solu-Medrol, magnesium.  Patient still requiring supplemental oxygen after these therapies.  Will require admission to the hospital.      DECISION TO DISCHARGE/ADMIT: see ED care timeline     FINAL IMPRESSION:   1 --asthma exacerbation  2 --respiratory failure with hypoxia and hypercapnia  3 --     Electronically signed by: Frances Abraham MD, 1/26/2024 05:21 Frances Acuna MD  01/26/24 0717    Dr. Townsend hospitalist, will admit       Ramesh Jaramillo DO  01/26/24 0759      Electronically signed by Ramesh Jaramillo DO at 01/26/24 0759       Vital Signs (last day)       Date/Time Temp Temp src Pulse Resp BP Patient Position SpO2    01/26/24 1347 98.4 (36.9) Oral 116 18 122/73 Lying 91    01/26/24 1215 -- -- 105 -- 110/66 -- 91    01/26/24 1145 -- -- 115 -- 109/80 -- 97    01/26/24 1116 -- -- -- 17 -- Lying --    01/26/24 1114 -- -- 110 -- 102/63 -- 92    01/26/24 1101 -- -- 107 -- 104/58 -- 93    01/26/24 1016 -- -- 107 -- 112/78 -- 92    01/26/24 1001 -- -- 101 -- 116/70 -- 94    01/26/24 0946 -- -- 102 -- 114/75 -- 94    01/26/24 0932 -- -- 97 -- 115/70 -- 94    01/26/24 0917 -- -- 117 -- 112/77 -- 94    01/26/24 0900 -- -- 101 -- 108/79 -- 93    01/26/24 0845 -- -- 109 -- 129/77 -- 93    01/26/24 0830 -- -- 115 -- 115/79 -- 96    01/26/24 0817 -- -- -- 20 -- Lying --    01/26/24 0805 -- -- 101 -- -- -- 92    01/26/24 0800 -- -- 104 -- 111/74 -- 91    01/26/24 0728 -- -- 100 -- 114/71 -- 92    01/26/24 0713 -- -- 101 -- 113/73 -- 90    01/26/24 0658 -- -- 105 -- 114/65 -- 92    01/26/24 0654 -- -- -- 18 -- -- 92    01/26/24 0645 -- -- 98 --  113/70 -- 100    01/26/24 0632 -- -- -- 18 -- Lying --    01/26/24 0630 -- -- 101 -- 113/70 -- 96    01/26/24 0619 -- -- 93 -- -- -- 95    01/26/24 0615 -- -- 101 -- 114/77 -- 96    01/26/24 0600 -- -- -- 32 98/79 -- --    01/26/24 0525 -- -- 116 -- 130/93 -- 92    01/26/24 0524 -- -- -- -- 130/93 Sitting --    01/26/24 0523 98.6 (37) Oral -- -- -- -- --    01/26/24 0521 -- -- 117 36 -- -- 91          Current Facility-Administered Medications   Medication Dose Route Frequency Provider Last Rate Last Admin    acetaminophen (TYLENOL) tablet 650 mg  650 mg Oral Q4H PRN KeshavBrittany paul APRN   650 mg at 01/26/24 0745    sennosides-docusate (PERICOLACE) 8.6-50 MG per tablet 2 tablet  2 tablet Oral BID KeshavBrittany paul APRN        And    polyethylene glycol (MIRALAX) packet 17 g  17 g Oral Daily PRN KeshavBrittany paul APRN        And    bisacodyl (DULCOLAX) EC tablet 5 mg  5 mg Oral Daily PRN KeshavBrittany paul APRN        And    bisacodyl (DULCOLAX) suppository 10 mg  10 mg Rectal Daily PRN Brittany Parr APRN        budesonide-formoterol (SYMBICORT) 160-4.5 MCG/ACT inhaler 2 puff  2 puff Inhalation BID - RT KeshavBrittany paul APRN   2 puff at 01/26/24 0918    Enoxaparin Sodium (LOVENOX) syringe 40 mg  40 mg Subcutaneous Daily KeshavBrittany paul APRN   40 mg at 01/26/24 0745    ipratropium-albuterol (DUO-NEB) nebulizer solution 3 mL  3 mL Nebulization Q4H - RT Farnces Abraham MD   3 mL at 01/26/24 1116    melatonin tablet 5 mg  5 mg Oral Nightly PRN Brittany Parr APRN        methadone (DOLOPHINE) tablet 100 mg  100 mg Oral Daily Brittany Parr APRN   100 mg at 01/26/24 1122    methylPREDNISolone sodium succinate (SOLU-Medrol) injection 60 mg  60 mg Intravenous Q12H Brittany Parr APRN        nitroglycerin (NITROSTAT) SL tablet 0.4 mg  0.4 mg Sublingual Q5 Min PRN Brittany Parr APRN        sodium chloride 0.9 % flush 10 mL  10 mL Intravenous Q12H Brittany Parr APRN   10 mL at 01/26/24 0810     sodium chloride 0.9 % flush 10 mL  10 mL Intravenous PRN Keshav Brittany WOO, APRN        sodium chloride 0.9 % infusion 40 mL  40 mL Intravenous PRN Keshav Brittany WOO APRABHINAV         Lab Results (last 24 hours)       Procedure Component Value Units Date/Time    STAT Lactic Acid, Reflex [[id number]]  (Abnormal) Collected: 01/26/24 1209    Specimen: Blood Updated: 01/26/24 1243     Lactate 5.1 mmol/L     STAT Lactic Acid, Reflex [[id number]]  (Abnormal) Collected: 01/26/24 0917    Specimen: Blood Updated: 01/26/24 0955     Lactate 3.6 mmol/L     High Sensitivity Troponin T 2Hr [[id number]] Collected: 01/26/24 0749    Specimen: Blood Updated: 01/26/24 0815     HS Troponin T 9 ng/L      Troponin T Delta --     Comment: Unable to calculate.       Narrative:      High Sensitive Troponin T Reference Range:  <14.0 ng/L- Negative Female for AMI  <22.0 ng/L- Negative Male for AMI  >=14 - Abnormal Female indicating possible myocardial injury.  >=22 - Abnormal Male indicating possible myocardial injury.   Clinicians would have to utilize clinical acumen, EKG, Troponin, and serial changes to determine if it is an Acute Myocardial Infarction or myocardial injury due to an underlying chronic condition.         Lactic Acid, Plasma [[id number]]  (Abnormal) Collected: 01/26/24 0612    Specimen: Blood Updated: 01/26/24 0707     Lactate 2.2 mmol/L     D-dimer, Quantitative [[id number]]  (Normal) Collected: 01/26/24 0526    Specimen: Blood Updated: 01/26/24 0604     D-Dimer, Quantitative 0.37 MCGFEU/mL     Narrative:      According to the assay 's published package insert, a normal (<0.50 MCGFEU/mL) D-dimer result in conjunction with a non-high clinical probability assessment, excludes deep vein thrombosis (DVT) and pulmonary embolism (PE) with high sensitivity.    D-dimer values increase with age and this can make VTE exclusion of an older population difficult. To address this, the American College of [employer], based on best  "available evidence and recent guidelines, recommends that clinicians use age-adjusted D-dimer thresholds in patients greater than 50 years of age with: a) a low probability of PE who do not meet all Pulmonary Embolism Rule Out Criteria, or b) in those with intermediate probability of PE.   The formula for an age-adjusted D-dimer cut-off is \"age/100\".  For example, a 60 year old patient would have an age-adjusted cut-off of 0.60 MCGFEU/mL and an 80 year old 0.80 MCGFEU/mL.    Comprehensive Metabolic Panel [[id number]]  (Abnormal) Collected: 01/26/24 0526    Specimen: Blood Updated: 01/26/24 0604     Glucose 159 mg/dL      BUN 11 mg/dL      Creatinine 0.94 mg/dL      Sodium 137 mmol/L      Potassium 4.8 mmol/L      Comment: Slight hemolysis detected by analyzer. Result may be falsely elevated.        Chloride 98 mmol/L      CO2 25.3 mmol/L      Calcium 8.8 mg/dL      Total Protein 7.6 g/dL      Albumin 4.1 g/dL      ALT (SGPT) 23 U/L      AST (SGOT) 33 U/L      Comment: Slight hemolysis detected by analyzer. Result may be falsely elevated.        Alkaline Phosphatase 112 U/L      Total Bilirubin 0.3 mg/dL      Globulin 3.5 gm/dL      A/G Ratio 1.2 g/dL      BUN/Creatinine Ratio 11.7     Anion Gap 13.7 mmol/L      eGFR 82.3 mL/min/1.73     Narrative:      GFR Normal >60  Chronic Kidney Disease <60  Kidney Failure <15      Magnesium [[id number]]  (Normal) Collected: 01/26/24 0526    Specimen: Blood Updated: 01/26/24 0604     Magnesium 2.2 mg/dL     Procalcitonin [[id number]]  (Normal) Collected: 01/26/24 0526    Specimen: Blood Updated: 01/26/24 0603     Procalcitonin 0.08 ng/mL     Narrative:      As a Marker for Sepsis (Non-Neonates):    1. <0.5 ng/mL represents a low risk of severe sepsis and/or septic shock.  2. >2 ng/mL represents a high risk of severe sepsis and/or septic shock.    As a Marker for Lower Respiratory Tract Infections that require antibiotic therapy:    PCT on Admission    Antibiotic Therapy       6-12 " "Hrs later    >0.5                Strongly Recommended  >0.25 - <0.5        Recommended   0.1 - 0.25          Discouraged              Remeasure/reassess PCT  <0.1                Strongly Discouraged     Remeasure/reassess PCT    As 28 day mortality risk marker: \"Change in Procalcitonin Result\" (>80% or <=80%) if Day 0 (or Day 1) and Day 4 values are available. Refer to http://www.[facility]-pct-calculator.com    Change in PCT <=80%  A decrease of PCT levels below or equal to 80% defines a positive change in PCT test result representing a higher risk for 28-day all-cause mortality of patients diagnosed with severe sepsis for septic shock.    Change in PCT >80%  A decrease of PCT levels of more than 80% defines a negative change in PCT result representing a lower risk for 28-day all-cause mortality of patients diagnosed with severe sepsis or septic shock.       High Sensitivity Troponin T [[id number]]  (Normal) Collected: 01/26/24 0526    Specimen: Blood Updated: 01/26/24 0558     HS Troponin T <6 ng/L     Narrative:      High Sensitive Troponin T Reference Range:  <14.0 ng/L- Negative Female for AMI  <22.0 ng/L- Negative Male for AMI  >=14 - Abnormal Female indicating possible myocardial injury.  >=22 - Abnormal Male indicating possible myocardial injury.   Clinicians would have to utilize clinical acumen, EKG, Troponin, and serial changes to determine if it is an Acute Myocardial Infarction or myocardial injury due to an underlying chronic condition.         Blood Gas, Venous With Co-Ox [[id number]]  (Abnormal) Collected: 01/26/24 0555    Specimen: Venous Blood Updated: 01/26/24 0555     Site OTHER     pH, Venous 7.285 pH Units      pCO2, Venous 68.3 mm Hg      Comment: 83 Value above reference range        pO2, Venous 39.5 mm Hg      HCO3, Venous 32.4 mmol/L      Comment: 83 Value above reference range        Base Excess, Venous 3.4 mmol/L      Comment: 83 Value above reference range        O2 Saturation, Venous 67.2 %  "     Oxyhemoglobin Venous 66.0 %      Methemoglobin Venous 0.4 %      Carboxyhemoglobin Venous 1.5 %      Barometric Pressure for Blood Gas 735 mmHg      Modality Nasal Cannula     Flow Rate 5.0 lpm      Ventilator Mode NA     Collected by [id number]     Comment: Meter: [id number]     :  [id number]       COVID-19 and FLU A/B PCR, 1 HR TAT - Swab, Nasopharynx [[id number]]  (Normal) Collected: 01/26/24 0527    Specimen: Swab from Nasopharynx Updated: 01/26/24 0553     COVID19 Not Detected     Influenza A PCR Not Detected     Influenza B PCR Not Detected    Narrative:      Fact sheet for providers: https://www.fda.gov/media/488569/download    Fact sheet for patients: https://www.fda.gov/media/218631/download    Test performed by PCR.    CBC & Differential [[id number]]  (Abnormal) Collected: 01/26/24 0526    Specimen: Blood Updated: 01/26/24 0534    Narrative:      The following orders were created for panel order CBC & Differential.  Procedure                               Abnormality         Status                     ---------                               -----------         ------                     CBC Auto Differential[[id number]]        Abnormal            Final result                 Please view results for these tests on the individual orders.    CBC Auto Differential [[id number]]  (Abnormal) Collected: 01/26/24 0526    Specimen: Blood Updated: 01/26/24 0534     WBC 9.93 10*3/mm3      RBC 5.32 10*6/mm3      Hemoglobin 15.4 g/dL      Hematocrit 46.9 %      MCV 88.2 fL      MCH 28.9 pg      MCHC 32.8 g/dL      RDW 13.1 %      RDW-SD 42.4 fl      MPV 10.6 fL      Platelets 320 10*3/mm3      Neutrophil % 62.2 %      Lymphocyte % 27.3 %      Monocyte % 5.1 %      Eosinophil % 4.6 %      Basophil % 0.6 %      Immature Grans % 0.2 %      Neutrophils, Absolute 6.17 10*3/mm3      Lymphocytes, Absolute 2.71 10*3/mm3      Monocytes, Absolute 0.51 10*3/mm3      Eosinophils, Absolute 0.46 10*3/mm3      Basophils,  Absolute 0.06 10*3/mm3      Immature Grans, Absolute 0.02 10*3/mm3      nRBC 0.0 /100 WBC           Imaging Results (Last 24 Hours)       Procedure Component Value Units Date/Time    XR Chest 1 View [[id number]] Collected: 01/26/24 0823     Updated: 01/26/24 0826    Narrative:      PROCEDURE: XR CHEST 1 VW-     HISTORY: soa     COMPARISON: September 11, 2017.     FINDINGS: The heart is normal in size. The lungs are clear. The  mediastinum is unremarkable. There is no pneumothorax.  There are no  acute osseous abnormalities. There is evidence of old calcified  granulomatous disease. Apical lordotic positioning noted.        Impression:      No acute cardiopulmonary process.              This report was signed and finalized on 1/26/2024 8:24 AM by Eliana Eid MD.             Physician Progress Notes (last 24 hours)  Notes from 01/25/24 1353 through 01/26/24 1353   No notes of this type exist for this encounter.       Consult Notes (last 24 hours)  Notes from 01/25/24 1353 through 01/26/24 1353   No notes of this type exist for this encounter.

## 2024-01-26 NOTE — ED NOTES
Verified with pharmacist at [facility] in [place], [place] that patients dose of 100mg Liquid Methadone was last given yesterday (1/25/24 approx 11:30), Notified MD.    n/a

## 2024-01-27 ENCOUNTER — READMISSION MANAGEMENT (OUTPATIENT)
Dept: CALL CENTER | Facility: HOSPITAL | Age: 34
End: 2024-01-27
Payer: MEDICAID

## 2024-01-27 VITALS
BODY MASS INDEX: 25.58 KG/M2 | OXYGEN SATURATION: 93 % | HEIGHT: 60 IN | WEIGHT: 130.29 LBS | RESPIRATION RATE: 17 BRPM | TEMPERATURE: 98.3 F | DIASTOLIC BLOOD PRESSURE: 53 MMHG | SYSTOLIC BLOOD PRESSURE: 92 MMHG | HEART RATE: 92 BPM

## 2024-01-27 LAB
ANION GAP SERPL CALCULATED.3IONS-SCNC: 7.3 MMOL/L (ref 5–15)
BASOPHILS # BLD AUTO: 0.03 10*3/MM3 (ref 0–0.2)
BASOPHILS NFR BLD AUTO: 0.2 % (ref 0–1.5)
BUN SERPL-MCNC: 9 MG/DL (ref 6–20)
BUN/CREAT SERPL: 13.4 (ref 7–25)
CALCIUM SPEC-SCNC: 8.8 MG/DL (ref 8.6–10.5)
CHLORIDE SERPL-SCNC: 103 MMOL/L (ref 98–107)
CO2 SERPL-SCNC: 27.7 MMOL/L (ref 22–29)
CREAT SERPL-MCNC: 0.67 MG/DL (ref 0.57–1)
D-LACTATE SERPL-SCNC: 2.3 MMOL/L (ref 0.5–2)
DEPRECATED RDW RBC AUTO: 41.7 FL (ref 37–54)
EGFRCR SERPLBLD CKD-EPI 2021: 118.5 ML/MIN/1.73
EOSINOPHIL # BLD AUTO: 0 10*3/MM3 (ref 0–0.4)
EOSINOPHIL NFR BLD AUTO: 0 % (ref 0.3–6.2)
ERYTHROCYTE [DISTWIDTH] IN BLOOD BY AUTOMATED COUNT: 13 % (ref 12.3–15.4)
GLUCOSE SERPL-MCNC: 128 MG/DL (ref 65–99)
HCT VFR BLD AUTO: 38.2 % (ref 34–46.6)
HGB BLD-MCNC: 12.9 G/DL (ref 12–15.9)
IMM GRANULOCYTES # BLD AUTO: 0.11 10*3/MM3 (ref 0–0.05)
IMM GRANULOCYTES NFR BLD AUTO: 0.7 % (ref 0–0.5)
LYMPHOCYTES # BLD AUTO: 1.06 10*3/MM3 (ref 0.7–3.1)
LYMPHOCYTES NFR BLD AUTO: 7.2 % (ref 19.6–45.3)
MCH RBC QN AUTO: 29.6 PG (ref 26.6–33)
MCHC RBC AUTO-ENTMCNC: 33.8 G/DL (ref 31.5–35.7)
MCV RBC AUTO: 87.6 FL (ref 79–97)
MONOCYTES # BLD AUTO: 0.21 10*3/MM3 (ref 0.1–0.9)
MONOCYTES NFR BLD AUTO: 1.4 % (ref 5–12)
NEUTROPHILS NFR BLD AUTO: 13.29 10*3/MM3 (ref 1.7–7)
NEUTROPHILS NFR BLD AUTO: 90.5 % (ref 42.7–76)
NRBC BLD AUTO-RTO: 0 /100 WBC (ref 0–0.2)
PLATELET # BLD AUTO: 292 10*3/MM3 (ref 140–450)
PMV BLD AUTO: 10.8 FL (ref 6–12)
POTASSIUM SERPL-SCNC: 4.6 MMOL/L (ref 3.5–5.2)
RBC # BLD AUTO: 4.36 10*6/MM3 (ref 3.77–5.28)
SODIUM SERPL-SCNC: 138 MMOL/L (ref 136–145)
WBC NRBC COR # BLD AUTO: 14.7 10*3/MM3 (ref 3.4–10.8)

## 2024-01-27 PROCEDURE — 94761 N-INVAS EAR/PLS OXIMETRY MLT: CPT

## 2024-01-27 PROCEDURE — 25010000002 ENOXAPARIN PER 10 MG: Performed by: NURSE PRACTITIONER

## 2024-01-27 PROCEDURE — 94799 UNLISTED PULMONARY SVC/PX: CPT

## 2024-01-27 PROCEDURE — 25010000002 METHYLPREDNISOLONE PER 125 MG: Performed by: NURSE PRACTITIONER

## 2024-01-27 PROCEDURE — 94664 DEMO&/EVAL PT USE INHALER: CPT

## 2024-01-27 PROCEDURE — 80048 BASIC METABOLIC PNL TOTAL CA: CPT | Performed by: NURSE PRACTITIONER

## 2024-01-27 PROCEDURE — 83605 ASSAY OF LACTIC ACID: CPT | Performed by: NURSE PRACTITIONER

## 2024-01-27 PROCEDURE — 99239 HOSP IP/OBS DSCHRG MGMT >30: CPT | Performed by: INTERNAL MEDICINE

## 2024-01-27 PROCEDURE — 85025 COMPLETE CBC W/AUTO DIFF WBC: CPT | Performed by: NURSE PRACTITIONER

## 2024-01-27 RX ORDER — PREDNISONE 20 MG/1
20 TABLET ORAL 2 TIMES DAILY
Qty: 10 TABLET | Refills: 0 | Status: SHIPPED | OUTPATIENT
Start: 2024-01-27

## 2024-01-27 RX ADMIN — ENOXAPARIN SODIUM 40 MG: 100 INJECTION SUBCUTANEOUS at 08:53

## 2024-01-27 RX ADMIN — METHADONE HYDROCHLORIDE 100 MG: 10 TABLET ORAL at 08:53

## 2024-01-27 RX ADMIN — IPRATROPIUM BROMIDE AND ALBUTEROL SULFATE 3 ML: .5; 3 SOLUTION RESPIRATORY (INHALATION) at 02:57

## 2024-01-27 RX ADMIN — Medication 10 ML: at 08:54

## 2024-01-27 RX ADMIN — IPRATROPIUM BROMIDE AND ALBUTEROL SULFATE 3 ML: .5; 3 SOLUTION RESPIRATORY (INHALATION) at 07:30

## 2024-01-27 RX ADMIN — BUDESONIDE AND FORMOTEROL FUMARATE DIHYDRATE 2 PUFF: 160; 4.5 AEROSOL RESPIRATORY (INHALATION) at 07:31

## 2024-01-27 RX ADMIN — METHYLPREDNISOLONE SODIUM SUCCINATE 60 MG: 125 INJECTION, POWDER, FOR SOLUTION INTRAMUSCULAR; INTRAVENOUS at 03:30

## 2024-01-27 NOTE — OUTREACH NOTE
Prep Survey      Flowsheet Row Responses   [facility]   Is LACE score < 7 ? No   Eligibility Readm Mgmt   Discharge diagnosis Asthma exacerbation   Does the patient have one of the following disease processes/diagnoses(primary or secondary)? Other   Does the patient have Home health ordered? No   Is there a DME ordered? No   Prep survey completed? Yes            Ann RAI - Registered Nurse

## 2024-01-27 NOTE — CASE MANAGEMENT/SOCIAL WORK
Case Management Discharge Note                Selected Continued Care - Admitted Since 1/26/2024       Destination    No services have been selected for the patient.                Durable Medical Equipment    No services have been selected for the patient.                Dialysis/Infusion    No services have been selected for the patient.                Home Medical Care    No services have been selected for the patient.                Therapy    No services have been selected for the patient.                Community Resources    No services have been selected for the patient.                [facility]    No services have been selected for the patient.                    Transportation Services  Private: Car    Final Discharge Disposition Code: 01 - home or self-care

## 2024-01-27 NOTE — DISCHARGE SUMMARY
[facility]      Name:  Charity Luna   Age:  34 y.o.  Sex:  female  :  1990  MRN:  [id number]   Visit Number:  [id number]  Primary Care Physician:  Amber Castillo APRN  Date of Discharge:  2024  Admission Date:  2024      Discharge Diagnosis:   Patient Active Problem List   Diagnosis    Asthma    Pneumonia    Tobacco abuse    Opioid abuse    Sepsis    Asthma exacerbation       Presenting Problem:  Asthma exacerbation [J45.901]       Consults:   Consults       No orders found for last 30 day(s).            Procedures Performed:           History of Presenting Illness:  Ms. Luna is a 34-year-old female with medical history pertinent for ocular syphilis, hepatitis C on Epclusa, asthma, and substance use disorder on methadone who presented to the emergency department secondary to shortness of air.  Onset of symptoms a couple days prior to presentation.  Denied associated chest pain or fever.  She does take daily Symbicort and albuterol.  Denies smoking tobacco however does vape.  No recent upper respiratory illness.     Upon ED presentation patient requiring 6 L nasal cannula and tachypneic with respiratory rate 36.  Pertinent labs and imaging included negative COVID and influenza swab, ABG with noted pH 7.2/pCO2 68.3/bicarb 32.4, lactic acid 2.2 with repeat 3.6, chest x-ray noted no acute cardiopulmonary process.  Patient was given several DuoNebs/magnesium sulfate/125 mg of methylprednisone/1 L fluid bolus in the emergency department.      Hospital Course:  Patient was admitted to the medicine service and was treated with IV solumedrol BID without antibiotics.  Lactate trended downwards.  UDS was noted to have methadone, methamphetamine, and fentanyl.  She had improvement in respirations and was on room air with normal sats the following day.  She was discharged with PO prednisone 20mg BID x 5 days and instructions to continue inhalers.      She is on methadone and she will need to contact her methadone clinic for any further doses of medication. Attempts were made to contact her clinic to ensure medications are arranged for.        Vital Signs:  Temp:  [97.7 °F (36.5 °C)-98.6 °F (37 °C)] 98.3 °F (36.8 °C)  Heart Rate:  [] 92  Resp:  [16-18] 17  BP: ()/(53-80) 92/53    Physical Exam:  Physical Exam  Vitals and nursing note reviewed.   Constitutional:       Appearance: Normal appearance. She is well-developed.   HENT:      Head: Normocephalic and atraumatic.      Nose: Nose normal.      Mouth/Throat:      Mouth: Mucous membranes are moist.      Pharynx: No oropharyngeal exudate.   Eyes:      General: No scleral icterus.     Conjunctiva/sclera: Conjunctivae normal.      Pupils: Pupils are equal, round, and reactive to light.   Neck:      Thyroid: No thyromegaly.   Cardiovascular:      Rate and Rhythm: Normal rate and regular rhythm.      Heart sounds: Normal heart sounds. No murmur heard.     No friction rub. No gallop.   Pulmonary:      Effort: Pulmonary effort is normal. No respiratory distress.      Breath sounds: Wheezing (mild bibasilar) present.   Abdominal:      General: Bowel sounds are normal. There is no distension.      Palpations: Abdomen is soft.      Tenderness: There is no abdominal tenderness.   Musculoskeletal:         General: No deformity or signs of injury.      Cervical back: Normal range of motion and neck supple.   Lymphadenopathy:      Cervical: No cervical adenopathy.   Skin:     General: Skin is warm and dry.      Findings: No rash.      Comments: Multiple tattoos, track marks on hands/arms    Neurological:      Mental Status: She is alert and oriented to person, place, and time.   Psychiatric:         Mood and Affect: Mood normal.         Behavior: Behavior normal.         Pertinent Lab Results:     Results from last 7 days   Lab Units 01/27/24  0619 01/26/24  0526   SODIUM mmol/L 138 137   POTASSIUM mmol/L 4.6  "4.8   CHLORIDE mmol/L 103 98   CO2 mmol/L 27.7 25.3   BUN mg/dL 9 11   CREATININE mg/dL 0.67 0.94   CALCIUM mg/dL 8.8 8.8   BILIRUBIN mg/dL  --  0.3   ALK PHOS U/L  --  112   ALT (SGPT) U/L  --  23   AST (SGOT) U/L  --  33*   GLUCOSE mg/dL 128* 159*     Results from last 7 days   Lab Units 01/27/24  0619 01/26/24  0526   WBC 10*3/mm3 14.70* 9.93   HEMOGLOBIN g/dL 12.9 15.4   HEMATOCRIT % 38.2 46.9*   PLATELETS 10*3/mm3 292 320         No results found for: \"BLOODCX\", \"URINECX\", \"WOUNDCX\", \"MRSACX\"      Pertinent Radiology Results:  Imaging Results (All)       Procedure Component Value Units Date/Time    XR Chest 1 View [[id number]] Collected: 01/26/24 0823     Updated: 01/26/24 0826    Narrative:      PROCEDURE: XR CHEST 1 VW-     HISTORY: soa     COMPARISON: September 11, 2017.     FINDINGS: The heart is normal in size. The lungs are clear. The  mediastinum is unremarkable. There is no pneumothorax.  There are no  acute osseous abnormalities. There is evidence of old calcified  granulomatous disease. Apical lordotic positioning noted.        Impression:      No acute cardiopulmonary process.              This report was signed and finalized on 1/26/2024 8:24 AM by Eliana Eid MD.               Condition on Discharge:    Stable    Code status during the hospital stay:  <no information>Full    Discharge Disposition:  Home or Self Care    Discharge Medication:     Discharge Medications        Changes to Medications        Instructions Start Date   predniSONE 20 MG tablet  Commonly known as: DELTASONE  What changed:   medication strength  how much to take  how to take this  when to take this  additional instructions   20 mg, Oral, 2 Times Daily             Continue These Medications        Instructions Start Date   albuterol sulfate  (90 Base) MCG/ACT inhaler  Commonly known as: PROVENTIL HFA;VENTOLIN HFA;PROAIR HFA   2 puffs, Inhalation, Every 4 Hours PRN      budesonide-formoterol 160-4.5 MCG/ACT " inhaler  Commonly known as: SYMBICORT   2 puffs, Inhalation, 2 Times Daily - RT      methadone 10 MG/ML solution  Commonly known as: DOLOPHINE   100 mg, Oral, Daily             Stop These Medications      levoFLOXacin 500 MG tablet  Commonly known as: LEVAQUIN              Discharge Diet:   Regular    Activity at Discharge:   As tolerated.    Follow-up Appointments:  No future appointments.      Test Results Pending at Discharge:  MARSHALL Magallon DO  01/27/24  10:51 EST    Time: Discharge 35 min

## 2024-01-27 NOTE — PLAN OF CARE
Goal Outcome Evaluation:                 Patient meets criteria for dc per MD.

## 2024-01-28 NOTE — PAYOR COMM NOTE
"To:  [employer]  From: Lissett Cerda RN  Phone: [phone]  Fax: [phone]  NPI: [id number]  TIN: [id number]  Member ID: [id number]   MRN: [id number]    Charity Luna (34 y.o. Female)       Date of Birth   1990    Social Security Number       Address   [address] [place], [place] [address]    Home Phone   [phone]    MRN   [id number]       Jehovah's witness   None    Marital Status   Single                            Admission Date   24    Admission Type   Emergency    Admitting Provider   Margaret Townsend MD    Attending Provider       Department, Room/Bed   [facility] TELEMETRY 3, 308/1       Discharge Date   2024    Discharge Disposition   Home or Self Care    Discharge Destination                                 Attending Provider: (none)   Allergies: Ceftin [Cefuroxime], Codeine, Sulfamethoxazole-trimethoprim    Isolation: None   Infection: None   Code Status: Prior    Ht: 152.4 cm (60\")   Wt: 59.1 kg (130 lb 4.7 oz)    Admission Cmt: None   Principal Problem: Asthma exacerbation [J45.901]                   Active Insurance as of 2024       Primary Coverage       Payor Plan Insurance Group Employer/Plan Group    [place] [id number]       Payor Plan Address Payor Plan Phone Number Payor Plan Fax Number Effective Dates    [employer] [address] [address] [phone]  2020 - None Entered    [facility] [id number]         Subscriber Name Subscriber Birth Date Member ID       CHARITY LUNA 1990 [id number]                     Emergency Contacts        (Rel.) Home Phone Work Phone Mobile Phone    Trino Luna (Father) [phone] -- --                 Discharge Summary        Tin Magallon DO at [facility]              [facility] HOSPITALIST   DISCHARGE SUMMARY      Name:  Charity Luna   Age:  34 y.o.  Sex:  female  :  1990  MRN:  [id number]   Visit Number:  [id number]  Primary Care " Physician:  Amber Castillo APRN  Date of Discharge:  1/27/2024  Admission Date:  1/26/2024      Discharge Diagnosis:   Patient Active Problem List   Diagnosis    Asthma    Pneumonia    Tobacco abuse    Opioid abuse    Sepsis    Asthma exacerbation       Presenting Problem:  Asthma exacerbation [J45.901]       Consults:   Consults       No orders found for last 30 day(s).            Procedures Performed:           History of Presenting Illness:  Ms. Luna is a 34-year-old female with medical history pertinent for ocular syphilis, hepatitis C on Epclusa, asthma, and substance use disorder on methadone who presented to the emergency department secondary to shortness of air.  Onset of symptoms a couple days prior to presentation.  Denied associated chest pain or fever.  She does take daily Symbicort and albuterol.  Denies smoking tobacco however does vape.  No recent upper respiratory illness.     Upon ED presentation patient requiring 6 L nasal cannula and tachypneic with respiratory rate 36.  Pertinent labs and imaging included negative COVID and influenza swab, ABG with noted pH 7.2/pCO2 68.3/bicarb 32.4, lactic acid 2.2 with repeat 3.6, chest x-ray noted no acute cardiopulmonary process.  Patient was given several DuoNebs/magnesium sulfate/125 mg of methylprednisone/1 L fluid bolus in the emergency department.      Hospital Course:  Patient was admitted to the medicine service and was treated with IV solumedrol BID without antibiotics.  Lactate trended downwards.  UDS was noted to have methadone, methamphetamine, and fentanyl.  She had improvement in respirations and was on room air with normal sats the following day.  She was discharged with PO prednisone 20mg BID x 5 days and instructions to continue inhalers.     She is on methadone and she will need to contact her methadone clinic for any further doses of medication. Attempts were made to contact her clinic to ensure medications are arranged for.         Vital Signs:  Temp:  [97.7 °F (36.5 °C)-98.6 °F (37 °C)] 98.3 °F (36.8 °C)  Heart Rate:  [] 92  Resp:  [16-18] 17  BP: ()/(53-80) 92/53    Physical Exam:  Physical Exam  Vitals and nursing note reviewed.   Constitutional:       Appearance: Normal appearance. She is well-developed.   HENT:      Head: Normocephalic and atraumatic.      Nose: Nose normal.      Mouth/Throat:      Mouth: Mucous membranes are moist.      Pharynx: No oropharyngeal exudate.   Eyes:      General: No scleral icterus.     Conjunctiva/sclera: Conjunctivae normal.      Pupils: Pupils are equal, round, and reactive to light.   Neck:      Thyroid: No thyromegaly.   Cardiovascular:      Rate and Rhythm: Normal rate and regular rhythm.      Heart sounds: Normal heart sounds. No murmur heard.     No friction rub. No gallop.   Pulmonary:      Effort: Pulmonary effort is normal. No respiratory distress.      Breath sounds: Wheezing (mild bibasilar) present.   Abdominal:      General: Bowel sounds are normal. There is no distension.      Palpations: Abdomen is soft.      Tenderness: There is no abdominal tenderness.   Musculoskeletal:         General: No deformity or signs of injury.      Cervical back: Normal range of motion and neck supple.   Lymphadenopathy:      Cervical: No cervical adenopathy.   Skin:     General: Skin is warm and dry.      Findings: No rash.      Comments: Multiple tattoos, track marks on hands/arms    Neurological:      Mental Status: She is alert and oriented to person, place, and time.   Psychiatric:         Mood and Affect: Mood normal.         Behavior: Behavior normal.         Pertinent Lab Results:     Results from last 7 days   Lab Units 01/27/24  0619 01/26/24  0526   SODIUM mmol/L 138 137   POTASSIUM mmol/L 4.6 4.8   CHLORIDE mmol/L 103 98   CO2 mmol/L 27.7 25.3   BUN mg/dL 9 11   CREATININE mg/dL 0.67 0.94   CALCIUM mg/dL 8.8 8.8   BILIRUBIN mg/dL  --  0.3   ALK PHOS U/L  --  112   ALT (SGPT) U/L  --   "23   AST (SGOT) U/L  --  33*   GLUCOSE mg/dL 128* 159*     Results from last 7 days   Lab Units 01/27/24  0619 01/26/24  0526   WBC 10*3/mm3 14.70* 9.93   HEMOGLOBIN g/dL 12.9 15.4   HEMATOCRIT % 38.2 46.9*   PLATELETS 10*3/mm3 292 320         No results found for: \"BLOODCX\", \"URINECX\", \"WOUNDCX\", \"MRSACX\"      Pertinent Radiology Results:  Imaging Results (All)       Procedure Component Value Units Date/Time    XR Chest 1 View [[id number]] Collected: 01/26/24 0823     Updated: 01/26/24 0826    Narrative:      PROCEDURE: XR CHEST 1 VW-     HISTORY: soa     COMPARISON: September 11, 2017.     FINDINGS: The heart is normal in size. The lungs are clear. The  mediastinum is unremarkable. There is no pneumothorax.  There are no  acute osseous abnormalities. There is evidence of old calcified  granulomatous disease. Apical lordotic positioning noted.        Impression:      No acute cardiopulmonary process.              This report was signed and finalized on 1/26/2024 8:24 AM by Eliana Eid MD.               Condition on Discharge:    Stable    Code status during the hospital stay:  <no information>Full    Discharge Disposition:  Home or Self Care    Discharge Medication:     Discharge Medications        Changes to Medications        Instructions Start Date   predniSONE 20 MG tablet  Commonly known as: DELTASONE  What changed:   medication strength  how much to take  how to take this  when to take this  additional instructions   20 mg, Oral, 2 Times Daily             Continue These Medications        Instructions Start Date   albuterol sulfate  (90 Base) MCG/ACT inhaler  Commonly known as: PROVENTIL HFA;VENTOLIN HFA;PROAIR HFA   2 puffs, Inhalation, Every 4 Hours PRN      budesonide-formoterol 160-4.5 MCG/ACT inhaler  Commonly known as: SYMBICORT   2 puffs, Inhalation, 2 Times Daily - RT      methadone 10 MG/ML solution  Commonly known as: DOLOPHINE   100 mg, Oral, Daily             Stop These Medications  "     levoFLOXacin 500 MG tablet  Commonly known as: LEVAQUIN              Discharge Diet:   Regular    Activity at Discharge:   As tolerated.    Follow-up Appointments:  No future appointments.      Test Results Pending at Discharge:  MARSHALL Magallon DO  01/27/24  10:51 EST    Time: Discharge 35 min              Electronically signed by Tin Magallon DO at 01/27/24 1057

## 2024-01-29 ENCOUNTER — TELEPHONE (OUTPATIENT)
Dept: TELEMETRY | Facility: HOSPITAL | Age: 34
End: 2024-01-29
Payer: MEDICAID

## 2024-01-30 ENCOUNTER — READMISSION MANAGEMENT (OUTPATIENT)
Dept: CALL CENTER | Facility: HOSPITAL | Age: 34
End: 2024-01-30
Payer: MEDICAID

## 2024-01-30 NOTE — OUTREACH NOTE
Medical Week 1 Survey      Flowsheet Row Responses   [facility] patient discharged from? [facility]   Does the patient have one of the following disease processes/diagnoses(primary or secondary)? Other   Week 1 attempt successful? Yes   Call start time 1603   Call end time 1605   Discharge diagnosis Asthma exacerbation   Meds reviewed with patient/caregiver? Yes   Is the patient having any side effects they believe may be caused by any medication additions or changes? No   Does the patient have all medications ordered at discharge? Yes   Is the patient taking all medications as directed (includes completed medication regime)? Yes   Does the patient have a primary care provider?  Yes   Does the patient have an appointment with their PCP within 7 days of discharge? No   What is preventing the patient from scheduling follow up appointments within 7 days of discharge? Haven't had time   Nursing Interventions Educated patient on importance of making appointment, Advised patient to make appointment   Has the patient kept scheduled appointments due by today? N/A   Has home health visited the patient within 72 hours of discharge? N/A   Psychosocial issues? No   Did the patient receive a copy of their discharge instructions? Yes   Nursing interventions Reviewed instructions with patient   What is the patient's perception of their health status since discharge? Improving   Is the patient/caregiver able to teach back signs and symptoms related to disease process for when to call PCP? Yes   Is the patient/caregiver able to teach back signs and symptoms related to disease process for when to call 911? Yes   Is the patient/caregiver able to teach back the hierarchy of who to call/visit for symptoms/problems? PCP, Specialist, Home health nurse, Urgent Care, ED, 911 Yes   Week 1 call completed? Yes   Call end time 1605            Cristina Sosa Nurse

## 2024-02-08 ENCOUNTER — READMISSION MANAGEMENT (OUTPATIENT)
Dept: CALL CENTER | Facility: HOSPITAL | Age: 34
End: 2024-02-08
Payer: MEDICAID

## 2024-02-08 NOTE — OUTREACH NOTE
Medical Week 2 Survey      Flowsheet Row Responses   [facility] facility patient discharged from? [facility]   Does the patient have one of the following disease processes/diagnoses(primary or secondary)? Other   Week 2 attempt successful? No   Unsuccessful attempts Attempt 1            Janiya Prince Registered Nurse

## 2024-02-13 ENCOUNTER — READMISSION MANAGEMENT (OUTPATIENT)
Dept: CALL CENTER | Facility: HOSPITAL | Age: 34
End: 2024-02-13
Payer: MEDICAID

## 2024-02-21 ENCOUNTER — READMISSION MANAGEMENT (OUTPATIENT)
Dept: CALL CENTER | Facility: HOSPITAL | Age: 34
End: 2024-02-21
Payer: MEDICAID

## 2024-02-21 NOTE — OUTREACH NOTE
Medical Week 3 Survey      Flowsheet Row Responses   [facility] patient discharged from? [facility]   Does the patient have one of the following disease processes/diagnoses(primary or secondary)? Other   Week 3 attempt successful? No   Unsuccessful attempts Attempt 1   Revoke Decline to participate            Elli DANGELO - Licensed Nurse

## 2024-03-05 DIAGNOSIS — M25.531 PAIN OF BOTH WRIST JOINTS: Primary | ICD-10-CM

## 2024-03-05 DIAGNOSIS — M25.532 PAIN OF BOTH WRIST JOINTS: Primary | ICD-10-CM
